# Patient Record
Sex: FEMALE | Race: WHITE | NOT HISPANIC OR LATINO | Employment: PART TIME | URBAN - NONMETROPOLITAN AREA
[De-identification: names, ages, dates, MRNs, and addresses within clinical notes are randomized per-mention and may not be internally consistent; named-entity substitution may affect disease eponyms.]

---
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
## 2022-05-24 ENCOUNTER — INITIAL PRENATAL (OUTPATIENT)
Dept: OBSTETRICS AND GYNECOLOGY | Facility: CLINIC | Age: 22
End: 2022-05-24

## 2022-05-24 VITALS — SYSTOLIC BLOOD PRESSURE: 110 MMHG | BODY MASS INDEX: 26.32 KG/M2 | DIASTOLIC BLOOD PRESSURE: 64 MMHG | WEIGHT: 148.6 LBS

## 2022-05-24 DIAGNOSIS — Z3A.01 LESS THAN 8 WEEKS GESTATION OF PREGNANCY: Primary | ICD-10-CM

## 2022-05-24 PROCEDURE — 99203 OFFICE O/P NEW LOW 30 MIN: CPT | Performed by: OBSTETRICS & GYNECOLOGY

## 2022-05-24 RX ORDER — METOCLOPRAMIDE 10 MG/1
10 TABLET ORAL
Qty: 120 TABLET | Refills: 2 | Status: SHIPPED | OUTPATIENT
Start: 2022-05-24 | End: 2022-10-07 | Stop reason: SDUPTHER

## 2022-05-24 NOTE — PROGRESS NOTES
Subjective   Chief Complaint   Patient presents with   • Initial Prenatal Visit     NOB With LMP 2022. TVS today with EDC 2023     Summer Elmore is a 22 y.o. year old .  No LMP recorded. Patient is pregnant.  She presents to be seen because of initiate of surgical care.  Ultrasound as noted below shows a 8-week 0-day viable intrauterine pregnancy.  Patient had a prior vaginal delivery at term 7 pounds no issues.  Overall good health.  She is status post gallbladder and tonsils removal  .     OTHER COMPLAINTS:  Nothing else    The following portions of the patient's history were reviewed and updated as appropriate:  She  has no past medical history on file.  She does not have any pertinent problems on file.  She  has a past surgical history that includes Cholecystectomy; Tonsillectomy; and Adenoidectomy.  Her family history includes Coronary artery disease in her maternal grandfather, maternal grandmother, paternal grandfather, and paternal grandmother; Hypertension in her father.  She  reports that she has quit smoking. Her smoking use included cigarettes. She has never used smokeless tobacco. She reports that she does not drink alcohol and does not use drugs.  No current outpatient medications on file.     No current facility-administered medications for this visit.     No current outpatient medications on file prior to visit.     No current facility-administered medications on file prior to visit.     She is allergic to morphine and related, oxycodone, and amoxicillin.    Social History    Tobacco Use      Smoking status: Former Smoker        Types: Cigarettes      Smokeless tobacco: Never Used      Tobacco comment: 4-5 cig/day    Review of Systems  Consitutional POS: nothing reported    NEG: anorexia or night sweats   Gastointestinal POS: nothing reported    NEG: bloating, change in bowel habits, melena or reflux symptoms   Genitourinary POS: nothing reported    NEG: dysuria or hematuria    Integument POS: nothing reported    NEG: moles that are changing in size, shape, color or rashes   Breast POS: nothing reported    NEG: persistent breast lump, skin dimpling or nipple discharge         Respiratory: negative  Cardiovascular: negative          Objective   /64   Wt 67.4 kg (148 lb 9.6 oz)   BMI 26.32 kg/m²     General:  well developed; well nourished  no acute distress   Skin:  No suspicious lesions seen   Thyroid: normal to inspection and palpation   Lungs:  breathing is unlabored  clear to auscultation bilaterally   Heart:  regular rate and rhythm, S1, S2 normal, no murmur, click, rub or gallop   Breasts:  Examined in supine position  Symmetric without masses or skin dimpling  Nipples normal without inversion, lesions or discharge  There are no palpable axillary nodes   Abdomen: soft, non-tender; no masses  no umbilical or inguinal hernias are present  no hepato-splenomegaly   Pelvis: Clinical staff was present for exam  External genitalia:  normal appearance of the external genitalia including Bartholin's and Brea's glands.  :  urethral meatus normal;  Vaginal:  normal pink mucosa without prolapse or lesions.  Cervix:  normal appearance.  Uterus:  normal size, shape and consistency.  Adnexa:  normal bimanual exam of the adnexa.  Rectal:  digital rectal exam not performed; anus visually normal appearing.     Psychiatric: Alert and oriented ×3, mood and affect appropriate  HEENT: Atraumatic, normocephalic, normal scleral icterus  Extremities: 2+ pulses bilaterally, no edema      Lab Review   No data reviewed    Imaging   Pelvic ultrasound images independantly reviewed - Intrauterine pregnancy 8 weeks 0 days.  PRIYA will be January 3, 2023.  Positive cardiac activity.  Simple 1.9 cm right ovarian cyst with thin septation.  Normal flow.  Normal cervix.  No solid masses        Assessment   1. Intrauterine pregnancy at 8 weeks  2. Prior a benign term vaginal delivery at 39 weeks 7  pounds  3. Nausea vomiting of pregnancy     Plan   1. Pap smear with cultures done  2. Prenatal labs  3. Reglan 4 times daily    No orders of the defined types were placed in this encounter.         This note was electronically signed.      May 24, 2022

## 2022-05-25 LAB
ABO GROUP BLD: NORMAL
BASOPHILS # BLD AUTO: 0 X10E3/UL (ref 0–0.2)
BASOPHILS NFR BLD AUTO: 1 %
BLD GP AB SCN SERPL QL: NEGATIVE
EOSINOPHIL # BLD AUTO: 0.3 X10E3/UL (ref 0–0.4)
EOSINOPHIL NFR BLD AUTO: 4 %
ERYTHROCYTE [DISTWIDTH] IN BLOOD BY AUTOMATED COUNT: 11.8 % (ref 11.7–15.4)
HBV SURFACE AG SERPL QL IA: NEGATIVE
HCT VFR BLD AUTO: 40.4 % (ref 34–46.6)
HCV AB S/CO SERPL IA: <0.1 S/CO RATIO (ref 0–0.9)
HGB BLD-MCNC: 13.3 G/DL (ref 11.1–15.9)
HIV 1+2 AB+HIV1 P24 AG SERPL QL IA: NON REACTIVE
IMM GRANULOCYTES # BLD AUTO: 0 X10E3/UL (ref 0–0.1)
IMM GRANULOCYTES NFR BLD AUTO: 0 %
LYMPHOCYTES # BLD AUTO: 1.2 X10E3/UL (ref 0.7–3.1)
LYMPHOCYTES NFR BLD AUTO: 18 %
MCH RBC QN AUTO: 31 PG (ref 26.6–33)
MCHC RBC AUTO-ENTMCNC: 32.9 G/DL (ref 31.5–35.7)
MCV RBC AUTO: 94 FL (ref 79–97)
MONOCYTES # BLD AUTO: 0.4 X10E3/UL (ref 0.1–0.9)
MONOCYTES NFR BLD AUTO: 6 %
NEUTROPHILS # BLD AUTO: 4.8 X10E3/UL (ref 1.4–7)
NEUTROPHILS NFR BLD AUTO: 71 %
PLATELET # BLD AUTO: 216 X10E3/UL (ref 150–450)
RBC # BLD AUTO: 4.29 X10E6/UL (ref 3.77–5.28)
RH BLD: NEGATIVE
RPR SER QL: NON REACTIVE
RUBV IGG SERPL IA-ACNC: 1.44 INDEX
WBC # BLD AUTO: 6.7 X10E3/UL (ref 3.4–10.8)

## 2022-05-27 LAB — REF LAB TEST METHOD: NORMAL

## 2022-06-20 ENCOUNTER — ROUTINE PRENATAL (OUTPATIENT)
Dept: OBSTETRICS AND GYNECOLOGY | Facility: CLINIC | Age: 22
End: 2022-06-20

## 2022-06-20 VITALS — DIASTOLIC BLOOD PRESSURE: 68 MMHG | WEIGHT: 149 LBS | BODY MASS INDEX: 26.39 KG/M2 | SYSTOLIC BLOOD PRESSURE: 108 MMHG

## 2022-06-20 DIAGNOSIS — Z3A.11 11 WEEKS GESTATION OF PREGNANCY: Primary | ICD-10-CM

## 2022-06-20 PROCEDURE — 99213 OFFICE O/P EST LOW 20 MIN: CPT | Performed by: MIDWIFE

## 2022-06-20 RX ORDER — FAMOTIDINE 20 MG/1
20 TABLET, FILM COATED ORAL 2 TIMES DAILY PRN
Qty: 60 TABLET | Refills: 2 | Status: SHIPPED | OUTPATIENT
Start: 2022-06-20 | End: 2022-10-20 | Stop reason: SDUPTHER

## 2022-06-20 NOTE — PROGRESS NOTES
Chief Complaint   Patient presents with   • Routine Prenatal Visit     C/o of migraine, heartburn , patient wants to do materniti 21 today        HPI: Summer is a  currently at 11w6d here for prenatal visit who reports the following:  She has been having intermittent migraines. She is taking Tylenol which doesn't help much. She usually goes to bed in a dark room for relief. She is also having heartburn and not taking anything for it.                EXAM:     Vitals:    22 1450   BP: 108/68      Abdomen:   See prenatal flowsheet as noted and reviewed, soft, nontender   Pelvic:  See prenatal flowsheet as noted and reviewed   Urine:  See prenatal flowsheet as noted and reviewed    Lab Results   Component Value Date    ABO AB 2022    RH Negative 2022    ABSCRN Negative 2022       MDM:  Impression: Rh negative  Headaches  GERD in pregnancy   Tests done today: Genetic screening-MaterniTi 21   Topics discussed: GERD management-Pepcid BIDHeadaches-increase water and protein. Offered Magnesium Rx   but declined  Reviewed OB labs   Tests next visit: none                RTO:                        4 weeks    This note was electronically signed.  Nano Santos, INGE  2022

## 2022-06-25 LAB
CFDNA.FET/CFDNA.TOTAL SFR FETUS: NORMAL %
CITATION REF LAB TEST: NORMAL
FET 13+18+21+X+Y ANEUP PLAS.CFDNA: NEGATIVE
FET CHR 21 TS PLAS.CFDNA QL: NEGATIVE
FET MS X RISK WBC.DNA+CFDNA QL: NOT DETECTED
FET SEX PLAS.CFDNA DOSAGE CFDNA: NORMAL
FET TS 13 RISK PLAS.CFDNA QL: NEGATIVE
FET TS 18 RISK WBC.DNA+CFDNA QL: NEGATIVE
FET X + Y ANEUP RISK PLAS.CFDNA SEQ-IMP: NOT DETECTED
GA EST FROM CONCEPTION DATE: NORMAL D
GESTATIONAL AGE > 9:: YES
LAB DIRECTOR NAME PROVIDER: NORMAL
LAB DIRECTOR NAME PROVIDER: NORMAL
LABORATORY COMMENT REPORT: NORMAL
LIMITATIONS OF THE TEST: NORMAL
NEGATIVE PREDICTIVE VALUE: NORMAL
NOTE: NORMAL
PERFORMANCE CHARACTERISTICS: NORMAL
POSITIVE PREDICTIVE VALUE: NORMAL
REF LAB TEST METHOD: NORMAL
TEST PERFORMANCE INFO SPEC: NORMAL

## 2022-07-18 ENCOUNTER — ROUTINE PRENATAL (OUTPATIENT)
Dept: OBSTETRICS AND GYNECOLOGY | Facility: CLINIC | Age: 22
End: 2022-07-18

## 2022-07-18 VITALS — BODY MASS INDEX: 26.39 KG/M2 | DIASTOLIC BLOOD PRESSURE: 60 MMHG | SYSTOLIC BLOOD PRESSURE: 106 MMHG | WEIGHT: 149 LBS

## 2022-07-18 DIAGNOSIS — Z34.82 ENCOUNTER FOR SUPERVISION OF OTHER NORMAL PREGNANCY, SECOND TRIMESTER: Primary | ICD-10-CM

## 2022-07-18 DIAGNOSIS — O21.9 NAUSEA AND VOMITING DURING PREGNANCY PRIOR TO 22 WEEKS GESTATION: ICD-10-CM

## 2022-07-18 DIAGNOSIS — K21.9 GASTROESOPHAGEAL REFLUX DISEASE WITHOUT ESOPHAGITIS: ICD-10-CM

## 2022-07-18 PROCEDURE — 99214 OFFICE O/P EST MOD 30 MIN: CPT | Performed by: OBSTETRICS & GYNECOLOGY

## 2022-07-18 NOTE — PROGRESS NOTES
Chief Complaint  Routine Prenatal Visit (No complaints. )    History of Present Illness:  Summer is a  currently at 15w6d who presents today with no complaints.  Patient does report having continued nausea however her symptoms are controlled with the Reglan.  Patient has been taking it twice daily.  Patient has also continued to take her Pepcid twice daily.  She does report taking prenatal vitamins.  Patient does report positive fetal movement.  She denies any cramping or contractions.  Patient denies any vaginal bleeding or spotting.  Patient did have previous genetic screening.    Exam:  Vitals:  See prenatal flowsheet as noted and reviewed  General: Alert, cooperative, and does not appear in any distress  Abdomen:   See prenatal flowsheet as noted and reviewed    Uterus gravid, non-tender; no palpable masses    No guarding or rebound tenderness  Pelvic:  See prenatal flowsheet as noted and reviewed  Ext:  See prenatal flowsheet as noted and reviewed    Moves extremities well, no cyanosis and no redness  Urine:  See prenatal flowsheet as noted and reviewed    Data Review:  The following data was reviewed by: Laly Damian MD on 2022:  Prenatal Labs:  Lab Results   Component Value Date    HGB 13.3 2022    RUBELLAABIGG 1.44 2022    HEPBSAG Negative 2022    ABO AB 2022    RH Negative 2022    ABSCRN Negative 2022    SJT9RKZ9 Non Reactive 2022    HEPCVIRUSABY <0.1 2022    POS8GCQR 135 2017    STREPGPB Negative 09/15/2017       Orders Only on 2022   Component Date Value   • Gestation 2022 Gomez    • Fetal Fraction 2022 5%    • Gestational Age >9: 2022 Yes    • Result 2022 Negative    •  Comments 2022 Comment    • Approved By 2022 Comment    • TRISOMY 21 (DOWN SYNDROM* 2022 Negative    • TRISOMY 18 (WYNNE SYND* 2022 Negative    • TRISOMY 13 (PATAU SYNDRO* 2022 Negative    •  FETAL SEX 2022 Comment    • MONOSOMY X (HICKS SYNDR* 2022 Not Detected    • XYY (ROMERO SYNDROME) 2022 Not Detected    • XXY (KLINEFELTER SYNDROM* 2022 Not Detected    • XXX (TRIPLE X SYNDROME) 2022 Not Detected    • NEGATIVE PREDICTIVE VALUE 2022 Note    • POSITIVE PREDICTIVE VALUE 2022 N/A    • About The Test 2022 Comment    • Test Method 2022 Comment    • Performance 2022 Comment    • PERFORMANCE CHARACTERIST* 2022 Note    • Limitations of the Test 2022 Comment    • Note 2022 Comment    • References 2022 Comment      Imaging:  US Ob Transvaginal   Intrauterine pregnancy 8 weeks 0 days.  PRIYA will be January 3, 2023.    Positive cardiac activity.  Simple 1.9 cm right ovarian cyst with thin   septation.  Normal flow.  Normal cervix.  No solid masses      Medical Records:  None    Assessment and Plan:  Problem List Items Addressed This Visit    None     Visit Diagnoses     Encounter for supervision of other normal pregnancy, second trimester    -  Primary  Topics discussed:     ab precautions  genetic screening - Today we discussed genetic testing.  She is aware that the MSAFP-4 is a screening test.  A screening test is not a diagnostic test.  This means that a negative test does not guarantee an unaffected fetus and a positive test does not mean the fetus has the condition for which the test is being performed.  If the test returns positive, a diagnostic test should be consider to determine if the fetus in fact has the condition.  After considering the options previously presented, she is not interested in having genetic testing performed.  GERD management  kick counts and fetal movement  PIH precautions   labor signs and symptoms  Anatomic scan next visit as noted.  Patient is to consider MSAFP.    Relevant Orders    US Ob 14 + Weeks Single or First Gestation    Nausea and vomiting during pregnancy prior to 22 weeks  gestation      Patient is to continue her Reglan as previously given.    Gastroesophageal reflux disease without esophagitis      Patient is to continue her Pepcid as previously given.        Follow Up/Instructions:  Follow up as scheduled.  Patient was given instructions and counseling regarding her condition or for health maintenance advice. Please see specific information pulled into the AVS if appropriate.     Note: Speech recognition transcription software may have been used to dictate portions of this document.  An attempt at proofreading has been made though minor errors in transcription may still be present.    This note was electronically signed.  Laly Damian M.D.

## 2022-08-24 ENCOUNTER — ROUTINE PRENATAL (OUTPATIENT)
Dept: OBSTETRICS AND GYNECOLOGY | Facility: CLINIC | Age: 22
End: 2022-08-24

## 2022-08-24 VITALS — DIASTOLIC BLOOD PRESSURE: 60 MMHG | WEIGHT: 152 LBS | SYSTOLIC BLOOD PRESSURE: 104 MMHG | BODY MASS INDEX: 26.93 KG/M2

## 2022-08-24 DIAGNOSIS — Z34.92 PRENATAL CARE IN SECOND TRIMESTER: Primary | ICD-10-CM

## 2022-08-24 PROCEDURE — 99213 OFFICE O/P EST LOW 20 MIN: CPT | Performed by: OBSTETRICS & GYNECOLOGY

## 2022-09-19 ENCOUNTER — ROUTINE PRENATAL (OUTPATIENT)
Dept: OBSTETRICS AND GYNECOLOGY | Facility: CLINIC | Age: 22
End: 2022-09-19

## 2022-09-19 VITALS — BODY MASS INDEX: 27.99 KG/M2 | SYSTOLIC BLOOD PRESSURE: 112 MMHG | DIASTOLIC BLOOD PRESSURE: 68 MMHG | WEIGHT: 158 LBS

## 2022-09-19 DIAGNOSIS — Z3A.24 24 WEEKS GESTATION OF PREGNANCY: ICD-10-CM

## 2022-09-19 DIAGNOSIS — Z34.82 ENCOUNTER FOR SUPERVISION OF OTHER NORMAL PREGNANCY IN SECOND TRIMESTER: Primary | ICD-10-CM

## 2022-09-19 LAB
BASOPHILS # BLD AUTO: 0.03 10*3/MM3 (ref 0–0.2)
BASOPHILS NFR BLD AUTO: 0.4 % (ref 0–1.5)
EOSINOPHIL # BLD AUTO: 0.14 10*3/MM3 (ref 0–0.4)
EOSINOPHIL NFR BLD AUTO: 1.8 % (ref 0.3–6.2)
ERYTHROCYTE [DISTWIDTH] IN BLOOD BY AUTOMATED COUNT: 12.2 % (ref 12.3–15.4)
GLUCOSE 1H P 50 G GLC PO SERPL-MCNC: 125 MG/DL (ref 65–139)
HCT VFR BLD AUTO: 33 % (ref 34–46.6)
HGB BLD-MCNC: 11.2 G/DL (ref 12–15.9)
IMM GRANULOCYTES # BLD AUTO: 0.05 10*3/MM3 (ref 0–0.05)
IMM GRANULOCYTES NFR BLD AUTO: 0.6 % (ref 0–0.5)
LYMPHOCYTES # BLD AUTO: 0.93 10*3/MM3 (ref 0.7–3.1)
LYMPHOCYTES NFR BLD AUTO: 12 % (ref 19.6–45.3)
MCH RBC QN AUTO: 32.9 PG (ref 26.6–33)
MCHC RBC AUTO-ENTMCNC: 33.9 G/DL (ref 31.5–35.7)
MCV RBC AUTO: 97.1 FL (ref 79–97)
MONOCYTES # BLD AUTO: 0.42 10*3/MM3 (ref 0.1–0.9)
MONOCYTES NFR BLD AUTO: 5.4 % (ref 5–12)
NEUTROPHILS # BLD AUTO: 6.15 10*3/MM3 (ref 1.7–7)
NEUTROPHILS NFR BLD AUTO: 79.8 % (ref 42.7–76)
NRBC BLD AUTO-RTO: 0 /100 WBC (ref 0–0.2)
PLATELET # BLD AUTO: 149 10*3/MM3 (ref 140–450)
RBC # BLD AUTO: 3.4 10*6/MM3 (ref 3.77–5.28)
WBC # BLD AUTO: 7.72 10*3/MM3 (ref 3.4–10.8)

## 2022-09-19 PROCEDURE — 99213 OFFICE O/P EST LOW 20 MIN: CPT | Performed by: MIDWIFE

## 2022-09-19 NOTE — PROGRESS NOTES
Chief Complaint   Patient presents with   • Routine Prenatal Visit     No Complaints/concerns        HPI: Summer is a  currently at 24w6d here for prenatal visit who reports the following:  Baby is active. Her nausea is improving.                EXAM:     Vitals:    22 0909   BP: 112/68      Abdomen:   See prenatal flowsheet as noted and reviewed, soft, nontender   Pelvic:  See prenatal flowsheet as noted and reviewed   Urine:  See prenatal flowsheet as noted and reviewed    Lab Results   Component Value Date    ABO AB 2022    RH Negative 2022    ABSCRN Negative 2022       MDM:  Impression: Supervision of low risk pregnancy  Rh negative   Tests done today: GCT  HgB   Topics discussed: kick counts and fetal movement  Reviewed OB labs   Tests next visit: Rhogam                RTO:                        3 weeks    This note was electronically signed.  Nano Santos, INGE  2022

## 2022-09-20 RX ORDER — FERROUS SULFATE TAB EC 324 MG (65 MG FE EQUIVALENT) 324 (65 FE) MG
324 TABLET DELAYED RESPONSE ORAL 2 TIMES DAILY WITH MEALS
Qty: 60 TABLET | Refills: 12 | Status: SHIPPED | OUTPATIENT
Start: 2022-09-20 | End: 2023-02-06

## 2022-10-07 RX ORDER — METOCLOPRAMIDE 10 MG/1
10 TABLET ORAL
Qty: 120 TABLET | Refills: 2 | Status: SHIPPED | OUTPATIENT
Start: 2022-10-07 | End: 2022-12-24 | Stop reason: HOSPADM

## 2022-10-10 ENCOUNTER — ROUTINE PRENATAL (OUTPATIENT)
Dept: OBSTETRICS AND GYNECOLOGY | Facility: CLINIC | Age: 22
End: 2022-10-10

## 2022-10-10 VITALS — WEIGHT: 160 LBS | SYSTOLIC BLOOD PRESSURE: 108 MMHG | DIASTOLIC BLOOD PRESSURE: 58 MMHG | BODY MASS INDEX: 28.34 KG/M2

## 2022-10-10 DIAGNOSIS — O99.019 MATERNAL ANEMIA IN PREGNANCY, ANTEPARTUM: ICD-10-CM

## 2022-10-10 DIAGNOSIS — Z34.82 ENCOUNTER FOR SUPERVISION OF OTHER NORMAL PREGNANCY IN SECOND TRIMESTER: Primary | ICD-10-CM

## 2022-10-10 DIAGNOSIS — Z67.91 RH NEGATIVE STATUS DURING PREGNANCY IN SECOND TRIMESTER: ICD-10-CM

## 2022-10-10 DIAGNOSIS — O26.892 RH NEGATIVE STATUS DURING PREGNANCY IN SECOND TRIMESTER: ICD-10-CM

## 2022-10-10 PROCEDURE — 96372 THER/PROPH/DIAG INJ SC/IM: CPT | Performed by: STUDENT IN AN ORGANIZED HEALTH CARE EDUCATION/TRAINING PROGRAM

## 2022-10-10 PROCEDURE — 99214 OFFICE O/P EST MOD 30 MIN: CPT | Performed by: STUDENT IN AN ORGANIZED HEALTH CARE EDUCATION/TRAINING PROGRAM

## 2022-10-10 NOTE — PROGRESS NOTES
Prenatal Care Visit    Subjective   Chief Complaint   Patient presents with   • Routine Prenatal Visit     History:   Summer is a  currently at 27w6d who presents for a prenatal care visit today.    Doing well. Denies CTX, VB, LOF. Reports (+) FM.     Objective   /58   Wt 72.6 kg (160 lb)   BMI 28.34 kg/m²   Physical Exam:  Normal, gestational age-appropriate exam today      Assessment & Plan     1. IUP @ 27w6d  2. Routine care: I have reviewed the prenatal labs and ultrasound(s) today. I have reviewed the most recent prenatal progress note(s). GTT and CBC reviewed.  3. Rh (-): Rhogam today  4. Anemia: on iron BID     Diagnosis Plan   1. Encounter for supervision of other normal pregnancy in second trimester        2. Rh negative status during pregnancy in second trimester        3. Maternal anemia in pregnancy, antepartum           Medication Management: Rhogam today, continue PNV and iron    Topics discussed: Prenatal care milestones  Iron supplementation  Kick counts and fetal movement  Labor signs and symptoms   Tests next visit: none   Next visit: 2 week(s)     Jana Corona MD  Obstetrics and Gynecology  [facility]

## 2022-10-19 ENCOUNTER — HOSPITAL ENCOUNTER (OUTPATIENT)
Facility: HOSPITAL | Age: 22
Discharge: HOME | End: 2022-10-19
Attending: OBSTETRICS & GYNECOLOGY | Admitting: OBSTETRICS & GYNECOLOGY

## 2022-10-19 VITALS
SYSTOLIC BLOOD PRESSURE: 112 MMHG | WEIGHT: 161 LBS | HEART RATE: 78 BPM | BODY MASS INDEX: 28.52 KG/M2 | TEMPERATURE: 98.6 F | RESPIRATION RATE: 16 BRPM | DIASTOLIC BLOOD PRESSURE: 62 MMHG

## 2022-10-19 LAB
BILIRUB BLD-MCNC: NEGATIVE MG/DL
CLARITY, POC: CLEAR
COLOR UR: YELLOW
GLUCOSE BLDC GLUCOMTR-MCNC: 91 MG/DL (ref 70–130)
GLUCOSE UR STRIP-MCNC: NEGATIVE MG/DL
KETONES UR QL: NEGATIVE
LEUKOCYTE EST, POC: NEGATIVE
NITRITE UR-MCNC: NEGATIVE MG/ML
PH UR: 7.5 [PH] (ref 5–8)
PROT UR STRIP-MCNC: NEGATIVE MG/DL
RBC # UR STRIP: NEGATIVE /UL
SP GR UR: 1 (ref 1–1.03)
UROBILINOGEN UR QL: NORMAL

## 2022-10-19 PROCEDURE — 82962 GLUCOSE BLOOD TEST: CPT

## 2022-10-19 PROCEDURE — 81002 URINALYSIS NONAUTO W/O SCOPE: CPT | Performed by: OBSTETRICS & GYNECOLOGY

## 2022-10-19 PROCEDURE — 59025 FETAL NON-STRESS TEST: CPT

## 2022-10-19 PROCEDURE — 59025 FETAL NON-STRESS TEST: CPT | Performed by: OBSTETRICS & GYNECOLOGY

## 2022-10-19 PROCEDURE — G0463 HOSPITAL OUTPT CLINIC VISIT: HCPCS

## 2022-10-19 RX ORDER — FAMOTIDINE 20 MG/1
20 TABLET, FILM COATED ORAL ONCE
Status: COMPLETED | OUTPATIENT
Start: 2022-10-19 | End: 2022-10-19

## 2022-10-19 RX ORDER — FAMOTIDINE 20 MG/1
20 TABLET, FILM COATED ORAL
Status: DISCONTINUED | OUTPATIENT
Start: 2022-10-20 | End: 2022-10-19

## 2022-10-19 RX ADMIN — FAMOTIDINE 20 MG: 20 TABLET ORAL at 20:40

## 2022-10-20 ENCOUNTER — TELEPHONE (OUTPATIENT)
Dept: OBSTETRICS AND GYNECOLOGY | Facility: CLINIC | Age: 22
End: 2022-10-20

## 2022-10-20 ENCOUNTER — HOSPITAL ENCOUNTER (OUTPATIENT)
Facility: HOSPITAL | Age: 22
Discharge: HOME | End: 2022-10-20
Attending: MIDWIFE | Admitting: MIDWIFE

## 2022-10-20 ENCOUNTER — HOSPITAL ENCOUNTER (OUTPATIENT)
Facility: HOSPITAL | Age: 22
End: 2022-10-20
Attending: MIDWIFE | Admitting: MIDWIFE

## 2022-10-20 VITALS
BODY MASS INDEX: 27.2 KG/M2 | HEART RATE: 92 BPM | WEIGHT: 159.3 LBS | RESPIRATION RATE: 18 BRPM | OXYGEN SATURATION: 97 % | HEIGHT: 64 IN | DIASTOLIC BLOOD PRESSURE: 64 MMHG | SYSTOLIC BLOOD PRESSURE: 114 MMHG

## 2022-10-20 LAB
BACTERIA UR QL AUTO: ABNORMAL /HPF
BILIRUB UR QL STRIP: NEGATIVE
CLARITY UR: ABNORMAL
COLOR UR: YELLOW
GLUCOSE UR STRIP-MCNC: NEGATIVE MG/DL
HGB UR QL STRIP.AUTO: NEGATIVE
HYALINE CASTS UR QL AUTO: ABNORMAL /LPF
KETONES UR QL STRIP: ABNORMAL
LEUKOCYTE ESTERASE UR QL STRIP.AUTO: ABNORMAL
NITRITE UR QL STRIP: NEGATIVE
PH UR STRIP.AUTO: 7 [PH] (ref 5–8)
PROT UR QL STRIP: NEGATIVE
RBC # UR STRIP: ABNORMAL /HPF
REF LAB TEST METHOD: ABNORMAL
SP GR UR STRIP: 1.01 (ref 1–1.03)
SQUAMOUS #/AREA URNS HPF: ABNORMAL /HPF
UROBILINOGEN UR QL STRIP: ABNORMAL
WBC # UR STRIP: ABNORMAL /HPF

## 2022-10-20 PROCEDURE — G0463 HOSPITAL OUTPT CLINIC VISIT: HCPCS

## 2022-10-20 PROCEDURE — 81001 URINALYSIS AUTO W/SCOPE: CPT | Performed by: MIDWIFE

## 2022-10-20 PROCEDURE — 87086 URINE CULTURE/COLONY COUNT: CPT | Performed by: MIDWIFE

## 2022-10-20 RX ORDER — FAMOTIDINE 20 MG/1
20 TABLET, FILM COATED ORAL 2 TIMES DAILY PRN
Qty: 60 TABLET | Refills: 4 | Status: SHIPPED | OUTPATIENT
Start: 2022-10-20 | End: 2022-10-20

## 2022-10-20 RX ORDER — FAMOTIDINE 20 MG/1
20 TABLET, FILM COATED ORAL 2 TIMES DAILY PRN
Qty: 60 TABLET | Refills: 4 | Status: SHIPPED | OUTPATIENT
Start: 2022-10-20 | End: 2023-02-06

## 2022-10-20 RX ORDER — NITROFURANTOIN 25; 75 MG/1; MG/1
100 CAPSULE ORAL EVERY 12 HOURS SCHEDULED
Status: COMPLETED | OUTPATIENT
Start: 2022-10-20 | End: 2022-10-20

## 2022-10-20 RX ADMIN — NITROFURANTOIN MONOHYDRATE/MACROCRYSTALLINE 100 MG: 25; 75 CAPSULE ORAL at 22:59

## 2022-10-20 NOTE — DISCHARGE INSTR - ACTIVITY
Go home on modified bedrest tonight.  Call tomorrow to get a refill for your prescription for Pepcid.  Keep your appointment on Monday. Come back if you are having any signs of labor.

## 2022-10-20 NOTE — SIGNIFICANT NOTE
10/19/22 2008   General Information   Provider Name Notified Of Admission Dr Laly Damian notified of pt's complaints, Hx BM today, SVE cl/th/-2 and soft, c/o heartburn and her running out of Pepcid.  Orders received to give a dose of 20mg Pepcid PO, then watch pt for awhile and pt may go home if she feels better after PO hydration and Pepcid.   Admission Provider Notification Time 2008

## 2022-10-20 NOTE — NON STRESS TEST
"Triage Note - Nursing Documentation  Labor and Delivery Admission Log    Summer Elmore  : 2000  MRN: [id number]  CSN: [id number]    Date in / Time in:  10/19/2022  Time in:     Date out / Time out:  10/19/2022  Time out:     Nurse: Ellyn Ferris, RN    Patient Info: She is a 22 y.o. year old  at 29w1d with an PRIYA of 1/3/2023, by Ultrasound who was seen on the [facility] Labor Flor. Pt placed on EFM and TOCO and given water to drink. SVE done. Closed/thick/-2.  Cervix cone shaped with cervix thicker at the base and thinner at the end, but completely closed mispositioned and very soft.  Watched pt an hour and a half after giving Pepcid. Pt states she is hungry and would like to eat something.  Pt given ham sandwich to eat.  Pt felt a little better after eating, but had two moderate contractions. Pt voided a large void and then contractions palpated mild.  Pt requesting to go home stating she felt a lot better.  Discharge instructions given to call the office in the morning to refill her Pepcid and to keep her appointment on the  with Vidya, but to come back sooner or call the office if she has any more signs of  labor.  Pt verbalized and able to teach back understanding of instructions.    Chief Complaint:   Chief Complaint   Patient presents with   • Abdominal Pain     \"I'm having abdominal pain and all over my middle to lower back.\"       Provider Instructions / Disposition: Pt c/o heartburn. Orders reciived from Dr Laly Damian to give pt Pepcid and see how pt feels after.    Patient Active Problem List   Diagnosis   (none) - all problems resolved or deleted       NST Documentation (Only applicable > 32 weeks): Interpretation A  Nonstress Test Interpretation A: Reactive (10/19/22 2215 : Ellyn Ferris, RN)    "

## 2022-10-20 NOTE — TELEPHONE ENCOUNTER
----- Message from Brie Fung MA sent at 10/20/2022 11:17 AM EDT -----  Patient is requesting refills on heart burn medication    Dr Chloe Forrest [facility]    Thank You

## 2022-10-21 RX ORDER — NITROFURANTOIN 25; 75 MG/1; MG/1
100 CAPSULE ORAL 2 TIMES DAILY
Qty: 14 CAPSULE | Refills: 0 | Status: SHIPPED | OUTPATIENT
Start: 2022-10-21 | End: 2022-10-28

## 2022-10-21 NOTE — NON STRESS TEST
Non Stress Test    [facility]    Patient: Summer Elmore  : 2000  MRN: [id number]  CSN: [id number]    Gestational Age: 29w1d    Indication for NST Abdominal pain and cramping  GERD       Time On 19:04   Time Off 22:12       Interpretation    Baseline 's beats per minute   Category 1   Decelerations Absent       Additional Comments See nursing notes       Recommendations for f/u See nursing notes       This note has been electronically signed.    Laly Damian M.D.

## 2022-10-21 NOTE — NON STRESS TEST
Triage Note - Nursing Documentation  Labor and Delivery Admission Log    Summer Elmore  : 2000  MRN: [id number]  CSN: [id number]    Date in / Time in:  10/20/2022  Time in:     Date out / Time out:    Time out:     Nurse: Alvina Ovalle RN    Patient Info: She is a 22 y.o. year old  at 29w2d with an PRIYA of 1/3/2023, by Ultrasound who was seen on the [facility] Labor Gross.    Chief Complaint:   Chief Complaint   Patient presents with   • Contractions   • Abdominal Pain       Provider Instructions / Disposition: discharge home, instructed on fetal kick counts, s/s of  labor and when to return to labor gross. Follow up with Ob office on Monday. Will send in prescription for Macrobid in am to [facility] in [facility]    Patient Active Problem List   Diagnosis   (none) - all problems resolved or deleted       NST Documentation (Only applicable > 32 weeks):

## 2022-10-22 LAB — BACTERIA SPEC AEROBE CULT: NORMAL

## 2022-10-24 ENCOUNTER — ROUTINE PRENATAL (OUTPATIENT)
Dept: OBSTETRICS AND GYNECOLOGY | Facility: CLINIC | Age: 22
End: 2022-10-24

## 2022-10-24 VITALS — SYSTOLIC BLOOD PRESSURE: 112 MMHG | BODY MASS INDEX: 27.29 KG/M2 | DIASTOLIC BLOOD PRESSURE: 68 MMHG | WEIGHT: 159 LBS

## 2022-10-24 DIAGNOSIS — Z34.83 ENCOUNTER FOR SUPERVISION OF OTHER NORMAL PREGNANCY IN THIRD TRIMESTER: Primary | ICD-10-CM

## 2022-10-24 PROCEDURE — 99213 OFFICE O/P EST LOW 20 MIN: CPT | Performed by: MIDWIFE

## 2022-10-24 NOTE — PROGRESS NOTES
Chief Complaint   Patient presents with   • Routine Prenatal Visit     No Complaints/concerns        HPI: Summer is a  currently at 29w6d here for prenatal visit who reports the following:  Baby is active. She went to  with C/O ctxs 2 days last week. She was treated with Macrobid and is feeling better. She denies any ctxs.                EXAM:     Vitals:    10/24/22 0904   BP: 112/68      Abdomen:   See prenatal flowsheet as noted and reviewed, soft, nontender   Pelvic:  See prenatal flowsheet as noted and reviewed   Urine:  See prenatal flowsheet as noted and reviewed    Lab Results   Component Value Date    ABO AB 2022    RH Negative 2022    ABSCRN Negative 2022       MDM:  Impression: Supervision of low risk pregnancy  Rh negative  Anemia in pregnancy   Tests done today: none   Topics discussed: kick counts and fetal movement   labor signs and symptoms  Reviewed OB labs   Tests next visit: U/S for EFW                RTO:                        2 weeks    This note was electronically signed.  Nano Santos, APRN  10/24/2022

## 2022-11-07 ENCOUNTER — ROUTINE PRENATAL (OUTPATIENT)
Dept: OBSTETRICS AND GYNECOLOGY | Facility: CLINIC | Age: 22
End: 2022-11-07

## 2022-11-07 VITALS — DIASTOLIC BLOOD PRESSURE: 60 MMHG | SYSTOLIC BLOOD PRESSURE: 116 MMHG | BODY MASS INDEX: 27.12 KG/M2 | WEIGHT: 158 LBS

## 2022-11-07 DIAGNOSIS — O99.019 MATERNAL ANEMIA IN PREGNANCY, ANTEPARTUM: ICD-10-CM

## 2022-11-07 DIAGNOSIS — Z34.83 ENCOUNTER FOR SUPERVISION OF OTHER NORMAL PREGNANCY IN THIRD TRIMESTER: Primary | ICD-10-CM

## 2022-11-07 DIAGNOSIS — Z36.89 ENCOUNTER FOR ULTRASOUND TO ASSESS FETAL GROWTH: ICD-10-CM

## 2022-11-07 DIAGNOSIS — K21.9 GASTROESOPHAGEAL REFLUX DISEASE WITHOUT ESOPHAGITIS: ICD-10-CM

## 2022-11-07 PROCEDURE — 99214 OFFICE O/P EST MOD 30 MIN: CPT | Performed by: OBSTETRICS & GYNECOLOGY

## 2022-11-07 NOTE — PROGRESS NOTES
Chief Complaint  Routine Prenatal Visit (Growth scan today, no complaints. )    History of Present Illness:  Summer is a  currently at 31w6d who presents today with no complaints.  Patient does report good fetal movement.  Patient denies any significant cramping or contractions.  Patient is taking her prenatal vitamins.  She is taking her iron supplements.  She has continued on her Pepcid as well.  Patient had previous delivery at 39 weeks of a girl weighing 7 pounds.  Patient does feel like this infant is larger.    Exam:  Vitals:  See prenatal flowsheet as noted and reviewed  General: Alert, cooperative, and does not appear in any distress  Abdomen:   See prenatal flowsheet as noted and reviewed    Uterus gravid, non-tender; no palpable masses    No guarding or rebound tenderness  Pelvic:  See prenatal flowsheet as noted and reviewed  Ext:  See prenatal flowsheet as noted and reviewed    Moves extremities well, no cyanosis and no redness  Urine:  See prenatal flowsheet as noted and reviewed    Data Review:  The following data was reviewed by: Laly Damian MD on 2022:  Prenatal Labs:  Lab Results   Component Value Date    HGB 11.2 (L) 2022    RUBELLAABIGG 1.44 2022    HEPBSAG Negative 2022    ABO AB 2022    RH Negative 2022    ABSCRN Negative 2022    PAS9ZCU0 Non Reactive 2022    HEPCVIRUSABY <0.1 2022    YGY9UZUY 135 2017    STREPGPB Negative 09/15/2017    URINECX >100,000 CFU/mL Mixed Jolanta Isolated 10/20/2022       Admission on 10/20/2022, Discharged on 10/20/2022   Component Date Value   • Color, UA 10/20/2022 Yellow    • Appearance, UA 10/20/2022 Cloudy (A)    • pH, UA 10/20/2022 7.0    • Specific Gravity, UA 10/20/2022 1.015    • Glucose, UA 10/20/2022 Negative    • Ketones, UA 10/20/2022 Trace (A)    • Bilirubin, UA 10/20/2022 Negative    • Blood, UA 10/20/2022 Negative    • Protein, UA 10/20/2022 Negative    • Leuk Esterase, UA 10/20/2022  Trace (A)    • Nitrite, UA 10/20/2022 Negative    • Urobilinogen, UA 10/20/2022 0.2 E.U./dL    • RBC, UA 10/20/2022 0-2 (A)    • WBC, UA 10/20/2022 3-5 (A)    • Bacteria, UA 10/20/2022 2+ (A)    • Squamous Epithelial Cell* 10/20/2022 3-6 (A)    • Hyaline Casts, UA 10/20/2022 0-2    • Methodology 10/20/2022 Manual Light Microscopy    • Urine Culture 10/20/2022 >100,000 CFU/mL Mixed Jolanta Isolated    Admission on 10/19/2022, Discharged on 10/19/2022   Component Date Value   • Color 10/19/2022 Yellow    • Clarity, UA 10/19/2022 Clear    • Glucose, UA 10/19/2022 Negative    • Bilirubin 10/19/2022 Negative    • Ketones, UA 10/19/2022 Negative    • Specific Gravity  10/19/2022 1.000 (A)    • Blood, UA 10/19/2022 Negative    • pH, Urine 10/19/2022 7.5    • Protein, POC 10/19/2022 Negative    • Urobilinogen, UA 10/19/2022 Normal    • Leukocytes 10/19/2022 Negative    • Nitrite, UA 10/19/2022 Negative    • Glucose 10/19/2022 91      Imaging:  US Ob Follow Up Transabdominal Approach  Summer Elmore  : 2000  MRN: [id number]  Date: 2022    Reason for exam/History:  Growth    Ultrasound images are reviewed.  There is noted to be a viable   intrauterine pregnancy. The pregnancy is measuring 33 weeks 0 days   gestation.  The estimated fetal weight is 2085 grams at the 73.5% for   growth.  The HC was at the 54.8% and the AC was at the 83.7%.  The   amniotic fluid index was 13.84 cms.  The fetal heart rate was normal.     The infant was in the vertex presentation.  The placental location was   noted to be posterior.      The exam limitations noted:  none    See ultrasound report for measurements and structures identified.    Laly Damian MD, MS  [facility]  OB GYN [place]     Medical Records:  None    Assessment and Plan:  Problem List Items Addressed This Visit    None  Visit Diagnoses     Encounter for supervision of other normal pregnancy in third trimester    -  Primary  Topics discussed:     GERD  management  iron supplementation  kick counts and fetal movement  PIH precautions   labor signs and symptoms  Recommend repeat scan in 4 weeks.    Encounter for ultrasound to assess fetal growth      Scan is obtained today.  Patient has been informed regarding those findings.  Repeat imaging in 4 weeks.    Maternal anemia in pregnancy, antepartum      Patient is to continue her iron supplements as discussed.  Instructions and precautions have been given.    Gastroesophageal reflux disease without esophagitis      Patient is to continue her Pepcid as previously given.        Follow Up/Instructions:  Follow up as scheduled.  Patient was given instructions and counseling regarding her condition or for health maintenance advice. Please see specific information pulled into the AVS if appropriate.     Note: Speech recognition transcription software may have been used to dictate portions of this document.  An attempt at proofreading has been made though minor errors in transcription may still be present.    This note was electronically signed.  Laly Damian M.D.

## 2022-11-21 ENCOUNTER — ROUTINE PRENATAL (OUTPATIENT)
Dept: OBSTETRICS AND GYNECOLOGY | Facility: CLINIC | Age: 22
End: 2022-11-21

## 2022-11-21 VITALS — DIASTOLIC BLOOD PRESSURE: 70 MMHG | WEIGHT: 159 LBS | BODY MASS INDEX: 27.29 KG/M2 | SYSTOLIC BLOOD PRESSURE: 112 MMHG

## 2022-11-21 DIAGNOSIS — Z34.93 PRENATAL CARE IN THIRD TRIMESTER: Primary | ICD-10-CM

## 2022-11-21 PROCEDURE — 99213 OFFICE O/P EST LOW 20 MIN: CPT | Performed by: OBSTETRICS & GYNECOLOGY

## 2022-11-21 NOTE — PROGRESS NOTES
Prenatal Care Visit    Subjective   Chief Complaint   Patient presents with   • Routine Prenatal Visit     No complaints       History:   Summer is a  currently at 33w6d who presents for a prenatal care visit today.    No major issues.    Social History    Tobacco Use      Smoking status: Every Day        Packs/day: 0.25        Years: 10.00        Pack years: 2.5        Types: Cigarettes      Smokeless tobacco: Never      Tobacco comments: 4-5 cig/day       Objective   /70   Wt 72.1 kg (159 lb)   BMI 27.29 kg/m²   Physical Exam:  Normal, gestational age-appropriate exam today        Plan   Medical Decision Making:    I have reviewed the prenatal labs and ultrasound(s) today. I have reviewed the most recent prenatal progress note(s).    Diagnosis: Supervision of high risk pregnancy    contractions  Rh NEG   Tests/Orders/Rx today: No orders of the defined types were placed in this encounter.      Medication Management: None     Topics discussed: Prenatal care milestones  kick counts and fetal movement  PIH precautions   labor signs and symptoms  U/S findings   Patient was admitted on 10/19- for  labor/contractions, symptoms stable now, no tocolytics  Rhogam on 10/10   NIPS NEG   Tests next visit: GBS testing   Next visit: 2 week(s)     Elmer Gaitan MD  Obstetrics and Gynecology  [facility]

## 2022-12-05 ENCOUNTER — ROUTINE PRENATAL (OUTPATIENT)
Dept: OBSTETRICS AND GYNECOLOGY | Facility: CLINIC | Age: 22
End: 2022-12-05

## 2022-12-05 VITALS — DIASTOLIC BLOOD PRESSURE: 60 MMHG | BODY MASS INDEX: 27.81 KG/M2 | SYSTOLIC BLOOD PRESSURE: 110 MMHG | WEIGHT: 162 LBS

## 2022-12-05 DIAGNOSIS — Z36.85 ENCOUNTER FOR ANTENATAL SCREENING FOR STREPTOCOCCUS B: Primary | ICD-10-CM

## 2022-12-05 PROCEDURE — 99213 OFFICE O/P EST LOW 20 MIN: CPT | Performed by: OBSTETRICS & GYNECOLOGY

## 2022-12-05 NOTE — PROGRESS NOTES
Chief Complaint   Patient presents with   • Routine Prenatal Visit     Prenatal visit with GBS done today. No problems or concerns.        HPI:   , 35w6d gestation reports doing well    ROS:  See Prenatal Episode/Flowsheet  /60   Wt 73.5 kg (162 lb)   BMI 27.81 kg/m²      EXAM:  EXTREMITIES:  No swelling-See Prenatal Episode/Flowsheet    ABDOMEN:  FHTs/Movement noted-See Prenatal Episode/Flowsheet    URINE GLUCOSE/PROTEIN:  See Prenatal Episode/Flowsheet    PELVIC EXAM:  See Prenatal Episode/Flowsheet  CV:  Lungs:  GYN:    MDM:    Lab Results   Component Value Date    HGB 11.2 (L) 2022    RUBELLAABIGG 1.44 2022    HEPBSAG Negative 2022    ABO AB 2022    RH Negative 2022    ABSCRN Negative 2022    JON9ZSC8 Non Reactive 2022    HEPCVIRUSABY <0.1 2022    VCL2AONY 135 2017    STREPGPB Negative 09/15/2017    URINECX >100,000 CFU/mL Mixed Jolanta Isolated 10/20/2022       U/S:US Ob Follow Up Transabdominal Approach (2022 10:19)      1. IUP 35w6d  2. Routine care   3. GBS done  4. Anemia: taking FE and PNV

## 2022-12-10 LAB
CLINDAMYCIN ISLT KB: ABNORMAL
GP B STREP DNA SPEC QL NAA+PROBE: POSITIVE
ORGANISM ID: ABNORMAL

## 2022-12-12 ENCOUNTER — ROUTINE PRENATAL (OUTPATIENT)
Dept: OBSTETRICS AND GYNECOLOGY | Facility: CLINIC | Age: 22
End: 2022-12-12

## 2022-12-12 VITALS — WEIGHT: 161 LBS | DIASTOLIC BLOOD PRESSURE: 64 MMHG | BODY MASS INDEX: 27.64 KG/M2 | SYSTOLIC BLOOD PRESSURE: 102 MMHG

## 2022-12-12 DIAGNOSIS — Z67.91 RH NEGATIVE STATUS DURING PREGNANCY IN THIRD TRIMESTER: ICD-10-CM

## 2022-12-12 DIAGNOSIS — Z34.93 PRENATAL CARE IN THIRD TRIMESTER: Primary | ICD-10-CM

## 2022-12-12 DIAGNOSIS — O99.820 GBS (GROUP B STREPTOCOCCUS CARRIER), +RV CULTURE, CURRENTLY PREGNANT: ICD-10-CM

## 2022-12-12 DIAGNOSIS — O26.893 RH NEGATIVE STATUS DURING PREGNANCY IN THIRD TRIMESTER: ICD-10-CM

## 2022-12-12 PROCEDURE — 99213 OFFICE O/P EST LOW 20 MIN: CPT | Performed by: OBSTETRICS & GYNECOLOGY

## 2022-12-15 ENCOUNTER — TELEPHONE (OUTPATIENT)
Dept: OBSTETRICS AND GYNECOLOGY | Facility: CLINIC | Age: 22
End: 2022-12-15

## 2022-12-15 ENCOUNTER — HOSPITAL ENCOUNTER (OUTPATIENT)
Facility: HOSPITAL | Age: 22
Discharge: HOME | End: 2022-12-15
Attending: MIDWIFE | Admitting: MIDWIFE

## 2022-12-15 VITALS
BODY MASS INDEX: 28.17 KG/M2 | TEMPERATURE: 98.9 F | SYSTOLIC BLOOD PRESSURE: 105 MMHG | WEIGHT: 164.1 LBS | HEART RATE: 100 BPM | DIASTOLIC BLOOD PRESSURE: 62 MMHG

## 2022-12-15 LAB
BILIRUB BLD-MCNC: NEGATIVE MG/DL
CLARITY, POC: CLEAR
COLOR UR: YELLOW
GLUCOSE UR STRIP-MCNC: NEGATIVE MG/DL
KETONES UR QL: NEGATIVE
LEUKOCYTE EST, POC: ABNORMAL
NITRITE UR-MCNC: NEGATIVE MG/ML
PH UR: 7.5 [PH] (ref 5–8)
PROT UR STRIP-MCNC: NEGATIVE MG/DL
RBC # UR STRIP: NEGATIVE /UL
SP GR UR: 1.01 (ref 1–1.03)
UROBILINOGEN UR QL: NORMAL

## 2022-12-15 PROCEDURE — 59025 FETAL NON-STRESS TEST: CPT

## 2022-12-15 PROCEDURE — 81002 URINALYSIS NONAUTO W/O SCOPE: CPT | Performed by: MIDWIFE

## 2022-12-15 PROCEDURE — 59025 FETAL NON-STRESS TEST: CPT | Performed by: MIDWIFE

## 2022-12-15 PROCEDURE — G0463 HOSPITAL OUTPT CLINIC VISIT: HCPCS

## 2022-12-15 NOTE — TELEPHONE ENCOUNTER
PT IS 37 WEEKS AND STATES SHE IS HAVING A PAIN UNDER HER STOMACH AND SHARP STABBING PAIN IN HER HIPS GOING TO HER LEGS.     PLEASE ADVISE PT, SHE CAN BE REACHED [phone].    Problem: Ventilation, Mechanical Invasive (NICU)  Goal: Signs and Symptoms of Listed Potential Problems Will be Absent, Minimized or Managed (Ventilation, Mechanical Invasive)  Signs and symptoms of listed potential problems will be absent, minimized or managed by discharge/transition of care (reference Ventilation, Mechanical Invasive (NICU) CPG).   Outcome: Ongoing (interventions implemented as appropriate)  Maintained ventilator settings. Fio2 requirements increased this pm. Endotracheal tube was suctioned four times for thick, yellow secretions. Pt currently stable with acceptable respiratory status.

## 2022-12-15 NOTE — NON STRESS TEST
Triage Note - Nursing Documentation  Labor and Delivery Admission Log    Summer Elmore  : 2000  MRN: [id number]  CSN: [id number]    Date in / Time in:  12/15/2022  Time in: 160    Date out / Time out:    Time out: 1711    Nurse: Elisa Soni, RN    Patient Info: She is a 22 y.o. year old  at 37w2d with an PRIYA of 1/3/2023, by Ultrasound who was seen on the [facility].    Chief Complaint:   Chief Complaint   Patient presents with   • Pain     Top of vagina, across hips and down legs. Making legs tingly and numb       Provider Instructions / Disposition: VE 1/30/-3, discharged home. Follows up on Monday.    Patient Active Problem List   Diagnosis   (none) - all problems resolved or deleted       NST Documentation (Only applicable > 32 weeks): Interpretation A  Nonstress Test Interpretation A: Reactive (12/15/22 1717 : Elisa Soni, RN)

## 2022-12-16 PROBLEM — Z67.91 RH NEGATIVE STATUS DURING PREGNANCY IN THIRD TRIMESTER: Status: ACTIVE | Noted: 2022-12-16

## 2022-12-16 PROBLEM — O99.820 GBS (GROUP B STREPTOCOCCUS CARRIER), +RV CULTURE, CURRENTLY PREGNANT: Status: ACTIVE | Noted: 2022-12-16

## 2022-12-16 PROBLEM — O26.893 RH NEGATIVE STATUS DURING PREGNANCY IN THIRD TRIMESTER: Status: ACTIVE | Noted: 2022-12-16

## 2022-12-16 NOTE — PROGRESS NOTES
Prenatal Care Visit    Subjective   Chief Complaint   Patient presents with   • Routine Prenatal Visit     No complaints       History:   Summer is a  currently at 36w6d who presents for a prenatal care visit today.    No major issues.    Social History    Tobacco Use      Smoking status: Every Day        Packs/day: 0.25        Years: 10.00        Pack years: 2.5        Types: Cigarettes      Smokeless tobacco: Never      Tobacco comments: 4-5 cig/day       Objective   /64   Wt 73 kg (161 lb)   BMI 27.64 kg/m²   Physical Exam:  Normal, gestational age-appropriate exam today        Plan   Medical Decision Making:    I have reviewed the prenatal labs and ultrasound(s) today. I have reviewed the most recent prenatal progress note(s).    Diagnosis: Supervision of high risk pregnancy    contractions  Rh NEG  GBS +   Tests/Orders/Rx today: No orders of the defined types were placed in this encounter.      Medication Management: None     Topics discussed: Prenatal care milestones  kick counts and fetal movement  PIH precautions   labor signs and symptoms  U/S findings   Rhogam on 10/10   NIPS NEG  GBS   Tests next visit: none   Next visit: 1 week(s)     Elmer Gaitan MD  Obstetrics and Gynecology  [facility]

## 2022-12-19 ENCOUNTER — PREP FOR SURGERY (OUTPATIENT)
Dept: OTHER | Facility: HOSPITAL | Age: 22
End: 2022-12-19

## 2022-12-19 ENCOUNTER — ROUTINE PRENATAL (OUTPATIENT)
Dept: OBSTETRICS AND GYNECOLOGY | Facility: CLINIC | Age: 22
End: 2022-12-19

## 2022-12-19 VITALS — DIASTOLIC BLOOD PRESSURE: 70 MMHG | BODY MASS INDEX: 27.46 KG/M2 | SYSTOLIC BLOOD PRESSURE: 112 MMHG | WEIGHT: 160 LBS

## 2022-12-19 DIAGNOSIS — O26.893 RH NEGATIVE STATUS DURING PREGNANCY IN THIRD TRIMESTER: ICD-10-CM

## 2022-12-19 DIAGNOSIS — Z34.83 ENCOUNTER FOR SUPERVISION OF OTHER NORMAL PREGNANCY IN THIRD TRIMESTER: Primary | ICD-10-CM

## 2022-12-19 DIAGNOSIS — Z67.91 RH NEGATIVE STATUS DURING PREGNANCY IN THIRD TRIMESTER: ICD-10-CM

## 2022-12-19 DIAGNOSIS — Z34.90 ENCOUNTER FOR INDUCTION OF LABOR: Primary | ICD-10-CM

## 2022-12-19 DIAGNOSIS — O99.820 GBS (GROUP B STREPTOCOCCUS CARRIER), +RV CULTURE, CURRENTLY PREGNANT: ICD-10-CM

## 2022-12-19 PROCEDURE — 99213 OFFICE O/P EST LOW 20 MIN: CPT | Performed by: MIDWIFE

## 2022-12-19 RX ORDER — SODIUM CHLORIDE 0.9 % (FLUSH) 0.9 %
10 SYRINGE (ML) INJECTION EVERY 12 HOURS SCHEDULED
Status: CANCELLED | OUTPATIENT
Start: 2022-12-19

## 2022-12-19 RX ORDER — ONDANSETRON 2 MG/ML
4 INJECTION INTRAMUSCULAR; INTRAVENOUS EVERY 6 HOURS PRN
Status: CANCELLED | OUTPATIENT
Start: 2022-12-19

## 2022-12-19 RX ORDER — CARBOPROST TROMETHAMINE 250 UG/ML
250 INJECTION, SOLUTION INTRAMUSCULAR ONCE AS NEEDED
Status: CANCELLED | OUTPATIENT
Start: 2022-12-19 | End: 2022-12-20

## 2022-12-19 RX ORDER — SODIUM CHLORIDE 0.9 % (FLUSH) 0.9 %
1-10 SYRINGE (ML) INJECTION AS NEEDED
Status: CANCELLED | OUTPATIENT
Start: 2022-12-19

## 2022-12-19 RX ORDER — ACETAMINOPHEN 325 MG/1
650 TABLET ORAL EVERY 4 HOURS PRN
Status: CANCELLED | OUTPATIENT
Start: 2022-12-19

## 2022-12-19 RX ORDER — OXYTOCIN/0.9 % SODIUM CHLORIDE 30/500 ML
250 PLASTIC BAG, INJECTION (ML) INTRAVENOUS CONTINUOUS
Status: CANCELLED | OUTPATIENT
Start: 2022-12-19 | End: 2022-12-19

## 2022-12-19 RX ORDER — OXYTOCIN/0.9 % SODIUM CHLORIDE 30/500 ML
999 PLASTIC BAG, INJECTION (ML) INTRAVENOUS ONCE
Status: CANCELLED | OUTPATIENT
Start: 2022-12-19 | End: 2022-12-19

## 2022-12-19 RX ORDER — METHYLERGONOVINE MALEATE 0.2 MG/ML
200 INJECTION INTRAVENOUS ONCE AS NEEDED
Status: CANCELLED | OUTPATIENT
Start: 2022-12-19 | End: 2022-12-20

## 2022-12-19 RX ORDER — ACETAMINOPHEN 325 MG/1
650 TABLET ORAL ONCE AS NEEDED
Status: CANCELLED | OUTPATIENT
Start: 2022-12-19

## 2022-12-19 RX ORDER — MAGNESIUM CARB/ALUMINUM HYDROX 105-160MG
30 TABLET,CHEWABLE ORAL ONCE
Status: CANCELLED | OUTPATIENT
Start: 2022-12-19 | End: 2022-12-19

## 2022-12-19 RX ORDER — OXYTOCIN/0.9 % SODIUM CHLORIDE 30/500 ML
2-20 PLASTIC BAG, INJECTION (ML) INTRAVENOUS
Status: CANCELLED | OUTPATIENT
Start: 2022-12-19

## 2022-12-19 RX ORDER — PROMETHAZINE HYDROCHLORIDE 12.5 MG/1
12.5 SUPPOSITORY RECTAL EVERY 6 HOURS PRN
Status: CANCELLED | OUTPATIENT
Start: 2022-12-19

## 2022-12-19 RX ORDER — ONDANSETRON 2 MG/ML
4 INJECTION INTRAMUSCULAR; INTRAVENOUS ONCE AS NEEDED
Status: CANCELLED | OUTPATIENT
Start: 2022-12-19

## 2022-12-19 RX ORDER — ONDANSETRON 4 MG/1
4 TABLET, FILM COATED ORAL EVERY 6 HOURS PRN
Status: CANCELLED | OUTPATIENT
Start: 2022-12-19

## 2022-12-19 RX ORDER — SODIUM CHLORIDE, SODIUM LACTATE, POTASSIUM CHLORIDE, CALCIUM CHLORIDE 600; 310; 30; 20 MG/100ML; MG/100ML; MG/100ML; MG/100ML
125 INJECTION, SOLUTION INTRAVENOUS CONTINUOUS
Status: CANCELLED | OUTPATIENT
Start: 2022-12-19

## 2022-12-19 RX ORDER — PROMETHAZINE HYDROCHLORIDE 12.5 MG/1
12.5 TABLET ORAL EVERY 6 HOURS PRN
Status: CANCELLED | OUTPATIENT
Start: 2022-12-19

## 2022-12-19 RX ORDER — ONDANSETRON 4 MG/1
4 TABLET, FILM COATED ORAL ONCE AS NEEDED
Status: CANCELLED | OUTPATIENT
Start: 2022-12-19

## 2022-12-19 RX ORDER — MISOPROSTOL 200 UG/1
800 TABLET ORAL AS NEEDED
Status: CANCELLED | OUTPATIENT
Start: 2022-12-19

## 2022-12-19 RX ORDER — LIDOCAINE HYDROCHLORIDE 10 MG/ML
5 INJECTION, SOLUTION EPIDURAL; INFILTRATION; INTRACAUDAL; PERINEURAL AS NEEDED
Status: CANCELLED | OUTPATIENT
Start: 2022-12-19

## 2022-12-19 NOTE — PROGRESS NOTES
Chief Complaint   Patient presents with   • Routine Prenatal Visit     Wants to discuss induction        HPI: Summer is a  currently at 37w6d here for prenatal visit who reports the following:  Baby is active. She has irregular ctxs. She went to  last week for sciatic pain. She wants to be induced next week .                EXAM:     Vitals:    22 1141   BP: 112/70      Abdomen:   See prenatal flowsheet as noted and reviewed, soft, nontender   Pelvic:  See prenatal flowsheet as noted and reviewed 3/70/-2   Urine:  See prenatal flowsheet as noted and reviewed    Lab Results   Component Value Date    ABO AB 2022    RH Negative 2022    ABSCRN Negative 2022       MDM:  Impression: Supervision of low risk pregnancy  GBS (+)  Rh negative  Anemia in pregnancy   Tests done today: none   Topics discussed: kick counts and fetal movement  labor signs and symptoms  Pitocin Induction    Reviewed OB labs   Tests next visit: none                RTO:                        1 weeks    This note was electronically signed.  INGE Nielson  2022

## 2022-12-21 ENCOUNTER — HOSPITAL ENCOUNTER (OUTPATIENT)
Facility: HOSPITAL | Age: 22
Discharge: HOME | End: 2022-12-21
Attending: OBSTETRICS & GYNECOLOGY | Admitting: OBSTETRICS & GYNECOLOGY

## 2022-12-21 VITALS
DIASTOLIC BLOOD PRESSURE: 55 MMHG | BODY MASS INDEX: 27.54 KG/M2 | SYSTOLIC BLOOD PRESSURE: 106 MMHG | HEART RATE: 90 BPM | RESPIRATION RATE: 18 BRPM | OXYGEN SATURATION: 97 % | HEIGHT: 64 IN | TEMPERATURE: 98.5 F | WEIGHT: 161.3 LBS

## 2022-12-21 LAB
BILIRUB BLD-MCNC: NEGATIVE MG/DL
CLARITY, POC: ABNORMAL
COLOR UR: YELLOW
GLUCOSE UR STRIP-MCNC: NEGATIVE MG/DL
KETONES UR QL: NEGATIVE
LEUKOCYTE EST, POC: ABNORMAL
NITRITE UR-MCNC: NEGATIVE MG/ML
PH UR: 7.5 [PH] (ref 5–8)
PROT UR STRIP-MCNC: NEGATIVE MG/DL
RBC # UR STRIP: ABNORMAL /UL
SP GR UR: 1.01 (ref 1–1.03)
UROBILINOGEN UR QL: NORMAL

## 2022-12-21 PROCEDURE — G0463 HOSPITAL OUTPT CLINIC VISIT: HCPCS

## 2022-12-21 PROCEDURE — 59025 FETAL NON-STRESS TEST: CPT | Performed by: OBSTETRICS & GYNECOLOGY

## 2022-12-21 PROCEDURE — 59025 FETAL NON-STRESS TEST: CPT

## 2022-12-21 PROCEDURE — 81002 URINALYSIS NONAUTO W/O SCOPE: CPT | Performed by: OBSTETRICS & GYNECOLOGY

## 2022-12-21 NOTE — NON STRESS TEST
Triage Note - Nursing Documentation  Labor and Delivery Admission Log    Summer Elmore  : 2000  MRN: [id number]  CSN: [id number]    Date in / Time in:  2022  Time in:     Date out / Time out:    Time out:   Nurse: Lauren Melgar, RN    Patient Info: She is a 22 y.o. year old  at 38w1d with an PRIYA of 1/3/2023, by Ultrasound who was seen on the [facility] Labor Gross.    Chief Complaint:   Chief Complaint   Patient presents with   • Contractions     Started at 1p        Provider Instructions / Disposition: to labor gross with c/o ctx approx every 15-18 minutes, VE 3/70/-2 no blood or fluid noted. EFM shows irriatibility and one large ctx on stay. Reactive NST. FOLLOW UP AS SCHEDULED    Patient Active Problem List   Diagnosis   • GBS (group B Streptococcus carrier), +RV culture, currently pregnant   • Rh negative status during pregnancy in third trimester       NST Documentation (Only applicable > 32 weeks): Interpretation A  Nonstress Test Interpretation A: Reactive (22 : Lauren Melgar, RN)

## 2022-12-23 ENCOUNTER — ANESTHESIA EVENT (OUTPATIENT)
Dept: LABOR AND DELIVERY | Facility: HOSPITAL | Age: 22
End: 2022-12-23
Payer: COMMERCIAL

## 2022-12-23 ENCOUNTER — HOSPITAL ENCOUNTER (INPATIENT)
Facility: HOSPITAL | Age: 22
LOS: 1 days | Discharge: HOME | End: 2022-12-24
Attending: OBSTETRICS & GYNECOLOGY | Admitting: MIDWIFE
Payer: COMMERCIAL

## 2022-12-23 ENCOUNTER — ANESTHESIA (OUTPATIENT)
Dept: LABOR AND DELIVERY | Facility: HOSPITAL | Age: 22
End: 2022-12-23
Payer: COMMERCIAL

## 2022-12-23 DIAGNOSIS — Z34.90 ENCOUNTER FOR INDUCTION OF LABOR: ICD-10-CM

## 2022-12-23 LAB
ABO GROUP BLD: NORMAL
BASOPHILS # BLD AUTO: 0.05 10*3/MM3 (ref 0–0.2)
BASOPHILS NFR BLD AUTO: 0.6 % (ref 0–1.5)
BLD GP AB SCN SERPL QL: NEGATIVE
DEPRECATED RDW RBC AUTO: 48.6 FL (ref 37–54)
EOSINOPHIL # BLD AUTO: 0.1 10*3/MM3 (ref 0–0.4)
EOSINOPHIL NFR BLD AUTO: 1.2 % (ref 0.3–6.2)
ERYTHROCYTE [DISTWIDTH] IN BLOOD BY AUTOMATED COUNT: 13.6 % (ref 12.3–15.4)
HCT VFR BLD AUTO: 39.1 % (ref 34–46.6)
HGB BLD-MCNC: 13.3 G/DL (ref 12–15.9)
IMM GRANULOCYTES # BLD AUTO: 0.05 10*3/MM3 (ref 0–0.05)
IMM GRANULOCYTES NFR BLD AUTO: 0.6 % (ref 0–0.5)
LYMPHOCYTES # BLD AUTO: 0.89 10*3/MM3 (ref 0.7–3.1)
LYMPHOCYTES NFR BLD AUTO: 10.7 % (ref 19.6–45.3)
MCH RBC QN AUTO: 32.6 PG (ref 26.6–33)
MCHC RBC AUTO-ENTMCNC: 34 G/DL (ref 31.5–35.7)
MCV RBC AUTO: 95.8 FL (ref 79–97)
MONOCYTES # BLD AUTO: 0.48 10*3/MM3 (ref 0.1–0.9)
MONOCYTES NFR BLD AUTO: 5.8 % (ref 5–12)
NEUTROPHILS NFR BLD AUTO: 6.72 10*3/MM3 (ref 1.7–7)
NEUTROPHILS NFR BLD AUTO: 81.1 % (ref 42.7–76)
NRBC BLD AUTO-RTO: 0 /100 WBC (ref 0–0.2)
PLATELET # BLD AUTO: 131 10*3/MM3 (ref 140–450)
PMV BLD AUTO: 12.4 FL (ref 6–12)
RBC # BLD AUTO: 4.08 10*6/MM3 (ref 3.77–5.28)
RH BLD: NEGATIVE
SARS-COV-2 RNA PNL SPEC NAA+PROBE: NOT DETECTED
T&S EXPIRATION DATE: NORMAL
WBC NRBC COR # BLD: 8.29 10*3/MM3 (ref 3.4–10.8)

## 2022-12-23 PROCEDURE — 86900 BLOOD TYPING SEROLOGIC ABO: CPT | Performed by: MIDWIFE

## 2022-12-23 PROCEDURE — 51701 INSERT BLADDER CATHETER: CPT

## 2022-12-23 PROCEDURE — C1755 CATHETER, INTRASPINAL: HCPCS | Performed by: NURSE ANESTHETIST, CERTIFIED REGISTERED

## 2022-12-23 PROCEDURE — 25010000002 VANCOMYCIN 1 G RECONSTITUTED SOLUTION: Performed by: MIDWIFE

## 2022-12-23 PROCEDURE — 87635 SARS-COV-2 COVID-19 AMP PRB: CPT | Performed by: MIDWIFE

## 2022-12-23 PROCEDURE — 86901 BLOOD TYPING SEROLOGIC RH(D): CPT | Performed by: MIDWIFE

## 2022-12-23 PROCEDURE — 85025 COMPLETE CBC W/AUTO DIFF WBC: CPT | Performed by: MIDWIFE

## 2022-12-23 PROCEDURE — 86850 RBC ANTIBODY SCREEN: CPT | Performed by: MIDWIFE

## 2022-12-23 PROCEDURE — 59410 OBSTETRICAL CARE: CPT | Performed by: OBSTETRICS & GYNECOLOGY

## 2022-12-23 RX ORDER — ONDANSETRON 2 MG/ML
4 INJECTION INTRAMUSCULAR; INTRAVENOUS ONCE AS NEEDED
Status: DISCONTINUED | OUTPATIENT
Start: 2022-12-23 | End: 2022-12-23 | Stop reason: HOSPADM

## 2022-12-23 RX ORDER — MISOPROSTOL 200 UG/1
800 TABLET ORAL AS NEEDED
Status: DISCONTINUED | OUTPATIENT
Start: 2022-12-23 | End: 2022-12-23 | Stop reason: HOSPADM

## 2022-12-23 RX ORDER — PROMETHAZINE HYDROCHLORIDE 25 MG/1
25 TABLET ORAL EVERY 6 HOURS PRN
Status: DISCONTINUED | OUTPATIENT
Start: 2022-12-23 | End: 2022-12-24 | Stop reason: HOSPADM

## 2022-12-23 RX ORDER — PROMETHAZINE HYDROCHLORIDE 12.5 MG/1
12.5 TABLET ORAL EVERY 6 HOURS PRN
Status: DISCONTINUED | OUTPATIENT
Start: 2022-12-23 | End: 2022-12-23 | Stop reason: HOSPADM

## 2022-12-23 RX ORDER — EPHEDRINE SULFATE 5 MG/ML
5 INJECTION INTRAVENOUS
Status: DISCONTINUED | OUTPATIENT
Start: 2022-12-23 | End: 2022-12-23 | Stop reason: HOSPADM

## 2022-12-23 RX ORDER — SODIUM CHLORIDE, SODIUM LACTATE, POTASSIUM CHLORIDE, CALCIUM CHLORIDE 600; 310; 30; 20 MG/100ML; MG/100ML; MG/100ML; MG/100ML
125 INJECTION, SOLUTION INTRAVENOUS CONTINUOUS
Status: DISCONTINUED | OUTPATIENT
Start: 2022-12-23 | End: 2022-12-24 | Stop reason: HOSPADM

## 2022-12-23 RX ORDER — CARBOPROST TROMETHAMINE 250 UG/ML
250 INJECTION, SOLUTION INTRAMUSCULAR ONCE AS NEEDED
Status: DISCONTINUED | OUTPATIENT
Start: 2022-12-23 | End: 2022-12-23 | Stop reason: HOSPADM

## 2022-12-23 RX ORDER — BISACODYL 10 MG
10 SUPPOSITORY, RECTAL RECTAL DAILY PRN
Status: DISCONTINUED | OUTPATIENT
Start: 2022-12-24 | End: 2022-12-24 | Stop reason: HOSPADM

## 2022-12-23 RX ORDER — SODIUM CHLORIDE 0.9 % (FLUSH) 0.9 %
1-10 SYRINGE (ML) INJECTION AS NEEDED
Status: DISCONTINUED | OUTPATIENT
Start: 2022-12-23 | End: 2022-12-24 | Stop reason: HOSPADM

## 2022-12-23 RX ORDER — DIPHENHYDRAMINE HCL 25 MG
25 CAPSULE ORAL NIGHTLY PRN
Status: DISCONTINUED | OUTPATIENT
Start: 2022-12-23 | End: 2022-12-24 | Stop reason: HOSPADM

## 2022-12-23 RX ORDER — SODIUM CHLORIDE 0.9 % (FLUSH) 0.9 %
10 SYRINGE (ML) INJECTION EVERY 12 HOURS SCHEDULED
Status: DISCONTINUED | OUTPATIENT
Start: 2022-12-23 | End: 2022-12-23 | Stop reason: HOSPADM

## 2022-12-23 RX ORDER — HYDROCORTISONE 25 MG/G
1 CREAM TOPICAL AS NEEDED
Status: DISCONTINUED | OUTPATIENT
Start: 2022-12-23 | End: 2022-12-24 | Stop reason: HOSPADM

## 2022-12-23 RX ORDER — ONDANSETRON 2 MG/ML
4 INJECTION INTRAMUSCULAR; INTRAVENOUS EVERY 6 HOURS PRN
Status: DISCONTINUED | OUTPATIENT
Start: 2022-12-23 | End: 2022-12-23 | Stop reason: HOSPADM

## 2022-12-23 RX ORDER — PROMETHAZINE HYDROCHLORIDE 12.5 MG/1
12.5 SUPPOSITORY RECTAL EVERY 6 HOURS PRN
Status: DISCONTINUED | OUTPATIENT
Start: 2022-12-23 | End: 2022-12-23 | Stop reason: HOSPADM

## 2022-12-23 RX ORDER — OXYTOCIN/0.9 % SODIUM CHLORIDE 30/500 ML
2-20 PLASTIC BAG, INJECTION (ML) INTRAVENOUS
Status: DISCONTINUED | OUTPATIENT
Start: 2022-12-23 | End: 2022-12-23 | Stop reason: HOSPADM

## 2022-12-23 RX ORDER — ONDANSETRON 4 MG/1
4 TABLET, FILM COATED ORAL ONCE AS NEEDED
Status: DISCONTINUED | OUTPATIENT
Start: 2022-12-23 | End: 2022-12-23 | Stop reason: HOSPADM

## 2022-12-23 RX ORDER — ACETAMINOPHEN 325 MG/1
650 TABLET ORAL EVERY 4 HOURS PRN
Status: DISCONTINUED | OUTPATIENT
Start: 2022-12-23 | End: 2022-12-23 | Stop reason: HOSPADM

## 2022-12-23 RX ORDER — ONDANSETRON 2 MG/ML
4 INJECTION INTRAMUSCULAR; INTRAVENOUS EVERY 6 HOURS PRN
Status: DISCONTINUED | OUTPATIENT
Start: 2022-12-23 | End: 2022-12-24 | Stop reason: HOSPADM

## 2022-12-23 RX ORDER — METHYLERGONOVINE MALEATE 0.2 MG/ML
200 INJECTION INTRAVENOUS ONCE AS NEEDED
Status: DISCONTINUED | OUTPATIENT
Start: 2022-12-23 | End: 2022-12-23 | Stop reason: HOSPADM

## 2022-12-23 RX ORDER — OXYTOCIN/0.9 % SODIUM CHLORIDE 30/500 ML
999 PLASTIC BAG, INJECTION (ML) INTRAVENOUS ONCE
Status: COMPLETED | OUTPATIENT
Start: 2022-12-23 | End: 2022-12-23

## 2022-12-23 RX ORDER — PRENATAL VIT/IRON FUM/FOLIC AC 27MG-0.8MG
1 TABLET ORAL DAILY
Status: DISCONTINUED | OUTPATIENT
Start: 2022-12-24 | End: 2022-12-24 | Stop reason: HOSPADM

## 2022-12-23 RX ORDER — ONDANSETRON 4 MG/1
4 TABLET, FILM COATED ORAL EVERY 8 HOURS PRN
Status: DISCONTINUED | OUTPATIENT
Start: 2022-12-23 | End: 2022-12-24 | Stop reason: HOSPADM

## 2022-12-23 RX ORDER — ACETAMINOPHEN 325 MG/1
650 TABLET ORAL ONCE AS NEEDED
Status: DISCONTINUED | OUTPATIENT
Start: 2022-12-23 | End: 2022-12-23 | Stop reason: HOSPADM

## 2022-12-23 RX ORDER — SODIUM CHLORIDE 0.9 % (FLUSH) 0.9 %
1-10 SYRINGE (ML) INJECTION AS NEEDED
Status: DISCONTINUED | OUTPATIENT
Start: 2022-12-23 | End: 2022-12-23 | Stop reason: HOSPADM

## 2022-12-23 RX ORDER — HYDROCODONE BITARTRATE AND ACETAMINOPHEN 5; 325 MG/1; MG/1
1 TABLET ORAL EVERY 4 HOURS PRN
Status: DISCONTINUED | OUTPATIENT
Start: 2022-12-23 | End: 2022-12-24 | Stop reason: HOSPADM

## 2022-12-23 RX ORDER — ONDANSETRON 4 MG/1
4 TABLET, FILM COATED ORAL EVERY 6 HOURS PRN
Status: DISCONTINUED | OUTPATIENT
Start: 2022-12-23 | End: 2022-12-23 | Stop reason: HOSPADM

## 2022-12-23 RX ORDER — TRISODIUM CITRATE DIHYDRATE AND CITRIC ACID MONOHYDRATE 500; 334 MG/5ML; MG/5ML
30 SOLUTION ORAL ONCE
Status: DISCONTINUED | OUTPATIENT
Start: 2022-12-23 | End: 2022-12-23 | Stop reason: HOSPADM

## 2022-12-23 RX ORDER — DOCUSATE SODIUM 100 MG/1
100 CAPSULE, LIQUID FILLED ORAL 2 TIMES DAILY PRN
Status: DISCONTINUED | OUTPATIENT
Start: 2022-12-23 | End: 2022-12-24 | Stop reason: HOSPADM

## 2022-12-23 RX ORDER — PROMETHAZINE HYDROCHLORIDE 12.5 MG/1
12.5 SUPPOSITORY RECTAL EVERY 6 HOURS PRN
Status: DISCONTINUED | OUTPATIENT
Start: 2022-12-23 | End: 2022-12-24 | Stop reason: HOSPADM

## 2022-12-23 RX ORDER — MAGNESIUM CARB/ALUMINUM HYDROX 105-160MG
30 TABLET,CHEWABLE ORAL ONCE
Status: COMPLETED | OUTPATIENT
Start: 2022-12-23 | End: 2022-12-23

## 2022-12-23 RX ORDER — LIDOCAINE HYDROCHLORIDE 10 MG/ML
5 INJECTION, SOLUTION EPIDURAL; INFILTRATION; INTRACAUDAL; PERINEURAL AS NEEDED
Status: DISCONTINUED | OUTPATIENT
Start: 2022-12-23 | End: 2022-12-23 | Stop reason: HOSPADM

## 2022-12-23 RX ORDER — OXYTOCIN/0.9 % SODIUM CHLORIDE 30/500 ML
250 PLASTIC BAG, INJECTION (ML) INTRAVENOUS CONTINUOUS
Status: ACTIVE | OUTPATIENT
Start: 2022-12-23 | End: 2022-12-23

## 2022-12-23 RX ORDER — HYDROCODONE BITARTRATE AND ACETAMINOPHEN 7.5; 325 MG/1; MG/1
1 TABLET ORAL EVERY 4 HOURS PRN
Status: DISCONTINUED | OUTPATIENT
Start: 2022-12-23 | End: 2022-12-24 | Stop reason: HOSPADM

## 2022-12-23 RX ORDER — IBUPROFEN 600 MG/1
600 TABLET ORAL EVERY 6 HOURS PRN
Status: DISCONTINUED | OUTPATIENT
Start: 2022-12-23 | End: 2022-12-24 | Stop reason: HOSPADM

## 2022-12-23 RX ADMIN — MINERAL OIL 30 ML: 1000 SOLUTION ORAL at 16:27

## 2022-12-23 RX ADMIN — SODIUM CHLORIDE 1000 MG: 900 INJECTION, SOLUTION INTRAVENOUS at 13:08

## 2022-12-23 RX ADMIN — Medication 650 ML/HR: at 16:39

## 2022-12-23 RX ADMIN — IBUPROFEN 600 MG: 600 TABLET ORAL at 20:34

## 2022-12-23 RX ADMIN — DOCUSATE SODIUM 100 MG: 100 CAPSULE, LIQUID FILLED ORAL at 20:35

## 2022-12-23 RX ADMIN — SODIUM CHLORIDE, POTASSIUM CHLORIDE, SODIUM LACTATE AND CALCIUM CHLORIDE 125 ML/HR: 600; 310; 30; 20 INJECTION, SOLUTION INTRAVENOUS at 13:03

## 2022-12-23 RX ADMIN — Medication 2 MILLI-UNITS/MIN: at 13:01

## 2022-12-23 RX ADMIN — Medication 10 ML/HR: at 16:18

## 2022-12-23 RX ADMIN — SODIUM CHLORIDE, POTASSIUM CHLORIDE, SODIUM LACTATE AND CALCIUM CHLORIDE 1000 ML: 600; 310; 30; 20 INJECTION, SOLUTION INTRAVENOUS at 15:21

## 2022-12-23 NOTE — L&D DELIVERY NOTE
[facility]  Vaginal Delivery Note    Delivery details     Delivery: Vaginal, Spontaneous     YOB: 2024    Time of Birth: 4:32 PM      Anesthesia:  Epidural   Delivering clinician:  Laly Damian M.D.   Forceps?   No   Vacuum? No    Shoulder dystocia present: No      Delivery narrative:  The patient progressed to complete and pushing.  With good maternal effort she delivered a viable female infant.  The head presented in the NELI presentation.  There was no nuchal cord present.  The anterior fetal shoulder was then easily delivered with a gentle downward motion followed by the posterior fetal shoulder, followed by the remainder of the infant without difficulty.  The infant was immediately vigorous.  The oropharynx and nares were bulb-suction.  The infant was placed on the maternal abdomen and the cord was clamped roughly 45 seconds following delivery.  Cord blood was then collected.  The placenta then delivered spontaneously intact, and a three vessel cord was noted.  Uterine massage and Pitocin 20 units IV was given until the fundus was firm.  The cervix, vagina, perineum, and rectum were carefully inspected for lacerations.  There was a first-degree midline perineal abrasion.  No suture was required.   Counts for needles, sponges, laps and instruments were correct at the end of the delivery.  There were no major complications.  Mother and baby were stable at the end of the delivery.    Infant details    Findings: female  infant     Infant observations: Weight: 3307 g (7 lb 4.7 oz)   Length: 20  in   Apgars: 8  @ 1 minute /    9  @ 5 minutes     Placenta, Cord, and Fluid    Placenta delivered  Spontaneous  at   12/23/2022  4:39 PM         Nuchal Cord?  no   Cord blood obtained: Yes      Repair    Episiotomy: None    Lacerations: Yes  Laceration Information  Laceration Repaired?   Perineal:  Superficial abrasion   Periurethral:       Labial:       Sulcus:       Vaginal:       Cervical:             Estimated Blood Loss:  500 ml       Complications  none    Disposition  Mother to Remain in LD  in stable condition currently.  Baby to remains with mom  in stable condition currently.      Laly Damian MD  12/23/22  17:31 EST

## 2022-12-23 NOTE — H&P
[facility] (Late Entry)  Summer Elmore  : 2000  MRN: [id number]  CSN: [id number]    History and Physical  Summer Elmore is a 22 y.o. year old  with an Estimated Date of Delivery: 1/3/23 currently at 38w3d presenting with spontaneous rupture of membranes at 8 AM.  Patient presented to labor and delivery.  She was not having any significant contractions.  Patient had been seen on labor and delivery last night.  Patient's GBS is positive.  She does have known penicillin allergy.    Prenatal care has been with [facility].  It has been benign other than iron deficiency anemia.    History  OB History    Para Term  AB Living   2 1 1 0 0 1   SAB IAB Ectopic Molar Multiple Live Births   0 0 0 0 0 1      # Outcome Date GA Lbr Nomi/2nd Weight Sex Delivery Anes PTL Lv   2 Current            1 Term 10/12/17 39w0d / 01:23 3175 g (7 lb) F Vag-Spont EPI N GURPREET      Name: KITTY HAJI      Apgar1: 9  Apgar5: 10     History reviewed. No pertinent past medical history.  No current facility-administered medications on file prior to encounter.     Current Outpatient Medications on File Prior to Encounter   Medication Sig Dispense Refill   • famotidine (Pepcid) 20 MG tablet Take 1 tablet by mouth 2 (Two) Times a Day As Needed for Heartburn. 60 tablet 4   • ferrous sulfate 324 (65 Fe) MG tablet delayed-release EC tablet Take 1 tablet by mouth 2 (Two) Times a Day With Meals. 60 tablet 12   • metoclopramide (Reglan) 10 MG tablet Take 1 tablet by mouth 4 (Four) Times a Day Before Meals & at Bedtime. 120 tablet 2   • Prenatal Vit-Fe Fumarate-FA (PRENATAL VITAMIN PO) Take 1 tablet by mouth Daily.       Allergies   Allergen Reactions   • Morphine And Related Other (See Comments)     Pt states caused \"burning all over\"     • Oxycodone Nausea And Vomiting   • Amoxicillin Rash     Past Surgical History:   Procedure Laterality Date   • CHOLECYSTECTOMY     • TONSILLECTOMY AND ADENOIDECTOMY Bilateral       Family History   Problem Relation Age of Onset   • Hypertension Father    • Coronary artery disease Paternal Grandfather    • Coronary artery disease Paternal Grandmother    • Coronary artery disease Maternal Grandmother    • Coronary artery disease Maternal Grandfather      Social History     Socioeconomic History   • Marital status: Significant Other   • Number of children: 1   • Highest education level: High school graduate   Tobacco Use   • Smoking status: Every Day     Packs/day: 0.25     Years: 10.00     Pack years: 2.50     Types: Cigarettes   • Smokeless tobacco: Never   • Tobacco comments:     4-5 cig/day   Vaping Use   • Vaping Use: Never used   Substance and Sexual Activity   • Alcohol use: No   • Drug use: No   • Sexual activity: Yes     Partners: Male     Birth control/protection: None     Review of Systems  The following systems were reviewed and negative:  constitution, eyes, ENT, respiratory, cardiovascular, gastrointestinal, genitourinary, integument, breast, hematologic / lymphatic, musculoskeletal, neurological, behavioral/psych, endocrine and allergies / immunologic    Objective  Vitals:    12/23/22 1515 12/23/22 1530 12/23/22 1545 12/23/22 1600   BP: 101/63 106/55 96/67 108/74   BP Location:       Patient Position:       Pulse: 64 86 84 97   Resp:       Temp:       TempSrc:       SpO2:   100% 100%   Weight:       Height:         Physical Exam:  General Appearance:  Alert and cooperative, not in any acute distress.   Head:  Atraumatic and normocephalic, without obvious abnormality.   Eyes:          PERRLA, conjunctivae and sclerae normal, no Icterus. No pallor. Extraocular movements are within normal limits.   Ears:  Ears appear intact with no abnormalities noted.   Throat: No oral lesions, no thrush, oral mucosa moist.   Neck: Supple, trachea midline, no thyromegaly, no carotid bruit.   Back:   No kyphoscoliosis present. No tenderness to palpation,   range of motion normal.  No CVAT.    Lungs:   Chest shape is normal. Breath sounds heard bilaterally equally.  No crackles or wheezing. No Pleural rub or bronchial breathing. Normal respiratory effort.   Heart:  Normal S1 and S2, no murmur, no gallop, no rub. No JVD.   Abdomen:   Normal bowel sounds, no masses, no hepatomegaly, no splenomegaly. Soft, non-tender, no guarding, no rebound tenderness.  Gravid uterus.   Extremities: Moves all extremities well, no edema, no cyanosis, no clubbing.  Normal range of motion. Normal strength and tone.   Skin: No bleeding, bruising or rash. No palpable masses noted or induration.   Psychiatric: Alert and oriented  to time, place, and person. Appropriate mood and affect. Thought content organized and appropriate judgment.       FHT's: reassuring and category 1   Contractions: irregular   Cervix:  2 to 3 cm / 50% effacement/ballotable per nursing   Presentation: cephalic       Prenatal Labs  Lab Results   Component Value Date    HGB 13.3 12/23/2022    HEPBSAG Negative 05/24/2022    ABSCRN Negative 12/23/2022    FUX6RTZ1 Non Reactive 05/24/2022    HEPCVIRUSABY <0.1 05/24/2022    RGE7PGPG 135 07/21/2017    STREPGPB Positive (A) 12/05/2022    URINECX >100,000 CFU/mL Mixed Jolanta Isolated 10/20/2022       Current Labs Reviewed   I reviewed the patient's new clinical results.     Lab Results (last 24 hours)     Procedure Component Value Units Date/Time    COVID PRE-OP / PRE-PROCEDURE SCREENING ORDER (NO ISOLATION) - Swab, Nasal Cavity [[id number]]  (Normal) Collected: 12/23/22 1245    Specimen: Swab from Nasal Cavity Updated: 12/23/22 1358    Narrative:      The following orders were created for panel order COVID PRE-OP / PRE-PROCEDURE SCREENING ORDER (NO ISOLATION) - Swab, Nasal Cavity.  Procedure                               Abnormality         Status                     ---------                               -----------         ------                     COVID-19,[facility] IN-SET[facility]...[[id number]]  Normal               Final result                 Please view results for these tests on the individual orders.    COVID-19,[facility] IN-HOUSE,Nasal Swab No Transport Media 3-4 HR TAT - Swab, Nasal Cavity [[id number]]  (Normal) Collected: 12/23/22 1245    Specimen: Swab from Nasal Cavity Updated: 12/23/22 1358     COVID19 Not Detected    Narrative:      Fact sheet for providers: https://www.fda.gov/media/320191/download     Fact sheet for patients: https://www.fda.gov/media/655427/download    Test performed by PCR.    Consider negative results in combination with clinical observations, patient history, and epidemiological information.    CBC & Differential [[id number]]  (Abnormal) Collected: 12/23/22 1250    Specimen: Blood Updated: 12/23/22 1329    Narrative:      The following orders were created for panel order CBC & Differential.  Procedure                               Abnormality         Status                     ---------                               -----------         ------                     CBC Auto Differential[[id number]]        Abnormal            Final result                 Please view results for these tests on the individual orders.    CBC Auto Differential [[id number]]  (Abnormal) Collected: 12/23/22 1250    Specimen: Blood Updated: 12/23/22 1329     WBC 8.29 10*3/mm3      RBC 4.08 10*6/mm3      Hemoglobin 13.3 g/dL      Hematocrit 39.1 %      MCV 95.8 fL      MCH 32.6 pg      MCHC 34.0 g/dL      RDW 13.6 %      RDW-SD 48.6 fl      MPV 12.4 fL      Platelets 131 10*3/mm3      Neutrophil % 81.1 %      Lymphocyte % 10.7 %      Monocyte % 5.8 %      Eosinophil % 1.2 %      Basophil % 0.6 %      Immature Grans % 0.6 %      Neutrophils, Absolute 6.72 10*3/mm3      Lymphocytes, Absolute 0.89 10*3/mm3      Monocytes, Absolute 0.48 10*3/mm3      Eosinophils, Absolute 0.10 10*3/mm3      Basophils, Absolute 0.05 10*3/mm3      Immature Grans, Absolute 0.05 10*3/mm3      nRBC 0.0 /100 WBC              Assessment   1. IUP at  38w3d  2. Group B strep status: positive  3. Spontaneous rupture of membranes  4. Penicillin allergy  5. Anemia     Plan   1. Augment with pitocin  2. ABx for GBS prophylaxis   3. Anticipate  later today    Laly Damian M.D.  2022  17:27 EST

## 2022-12-23 NOTE — ANESTHESIA PROCEDURE NOTES
Labor Epidural      Patient reassessed immediately prior to procedure    Patient location during procedure: OB  Start Time: 12/23/2022 4:13 PM  Performed By  CRNA/CAA: Osmin Mercedes CRNA  Preanesthetic Checklist  Completed: patient identified, IV checked, site marked, risks and benefits discussed, surgical consent, monitors and equipment checked, pre-op evaluation and timeout performed  Additional Notes  Risks discussed , including but not limited to:  Headache, itching, n&v, infection, failure, decreased blood pressure, permanent chronic/back pain, nerve damage, paralysis, etc. All questions answered and informed consent obtained. Skin localized with lidocaine skin wheel. Test dose 2+3ml of 1.5% lidocaine with 1:200,000 epi.  Prep:  Pt Position:sitting  Sterile Tech:cap, gloves, mask and sterile barrier  Prep:chlorhexidine gluconate and isopropyl alcohol  Monitoring:blood pressure monitoring and continuous pulse oximetry  Epidural Block Procedure:  Approach:midline  Guidance:landmark technique and palpation technique  Location:L3-L4  Needle Type:Tuohy  Needle Gauge:18 G  Loss of Resistance Medium: air  Loss of Resistance: 6cm  Cath Depth at skin:14 cm  Paresthesia: none  Aspiration:negative  Test Dose:negative  Number of Attempts: 1  Post Assessment:  Dressing:occlusive dressing applied and secured with tape  Pt Tolerance:patient tolerated the procedure well with no apparent complications  Complications:no

## 2022-12-23 NOTE — ANESTHESIA PREPROCEDURE EVALUATION
Anesthesia Evaluation     Patient summary reviewed and Nursing notes reviewed   no history of anesthetic complications:  NPO Solid Status: > 8 hours  NPO Liquid Status: > 8 hours           Airway   Mallampati: I  TM distance: >3 FB  Neck ROM: full  no difficulty expected  Dental - normal exam     Pulmonary - normal exam   (+) a smoker Current,   Cardiovascular - negative cardio ROS and normal exam  Exercise tolerance: good (4-7 METS)    Rhythm: regular  Rate: normal        Neuro/Psych- negative ROS  GI/Hepatic/Renal/Endo    (+)  GERD well controlled,      Musculoskeletal (-) negative ROS    Abdominal  - normal exam    Abdomen: soft.  Bowel sounds: normal.   Substance History - negative use     OB/GYN    (+) Pregnant,         Other - negative ROS       ROS/Med Hx Other: plt - 131                    Anesthesia Plan    ASA 2     epidural     (Risks discussed , including but not limited to:  Headache, itching, n&v, infection, failure, decreased blood pressure, permanent chronic/back pain, nerve damage, paralysis, etc. All questions answered and informed consent obtained. )  intravenous induction     Anesthetic plan, risks, benefits, and alternatives have been provided, discussed and informed consent has been obtained with: patient.  Pre-procedure education provided

## 2022-12-24 VITALS
WEIGHT: 161.5 LBS | DIASTOLIC BLOOD PRESSURE: 58 MMHG | BODY MASS INDEX: 27.57 KG/M2 | SYSTOLIC BLOOD PRESSURE: 115 MMHG | TEMPERATURE: 98.3 F | HEIGHT: 64 IN | RESPIRATION RATE: 18 BRPM | OXYGEN SATURATION: 96 % | HEART RATE: 70 BPM

## 2022-12-24 PROBLEM — Z3A.38 38 WEEKS GESTATION OF PREGNANCY: Status: ACTIVE | Noted: 2022-12-23

## 2022-12-24 LAB
ABO GROUP BLD: NORMAL
BASOPHILS # BLD AUTO: 0.03 10*3/MM3 (ref 0–0.2)
BASOPHILS NFR BLD AUTO: 0.3 % (ref 0–1.5)
DEPRECATED RDW RBC AUTO: 45.5 FL (ref 37–54)
EOSINOPHIL # BLD AUTO: 0.14 10*3/MM3 (ref 0–0.4)
EOSINOPHIL NFR BLD AUTO: 1.6 % (ref 0.3–6.2)
ERYTHROCYTE [DISTWIDTH] IN BLOOD BY AUTOMATED COUNT: 13.3 % (ref 12.3–15.4)
FETAL BLEED: NEGATIVE
HCT VFR BLD AUTO: 31.4 % (ref 34–46.6)
HGB BLD-MCNC: 10.8 G/DL (ref 12–15.9)
IMM GRANULOCYTES # BLD AUTO: 0.03 10*3/MM3 (ref 0–0.05)
IMM GRANULOCYTES NFR BLD AUTO: 0.3 % (ref 0–0.5)
LYMPHOCYTES # BLD AUTO: 1.23 10*3/MM3 (ref 0.7–3.1)
LYMPHOCYTES NFR BLD AUTO: 14.2 % (ref 19.6–45.3)
MCH RBC QN AUTO: 32.3 PG (ref 26.6–33)
MCHC RBC AUTO-ENTMCNC: 34.4 G/DL (ref 31.5–35.7)
MCV RBC AUTO: 94 FL (ref 79–97)
MONOCYTES # BLD AUTO: 0.56 10*3/MM3 (ref 0.1–0.9)
MONOCYTES NFR BLD AUTO: 6.4 % (ref 5–12)
NEUTROPHILS NFR BLD AUTO: 6.7 10*3/MM3 (ref 1.7–7)
NEUTROPHILS NFR BLD AUTO: 77.2 % (ref 42.7–76)
NRBC BLD AUTO-RTO: 0 /100 WBC (ref 0–0.2)
NUMBER OF DOSES: NORMAL
PLATELET # BLD AUTO: 137 10*3/MM3 (ref 140–450)
PMV BLD AUTO: 12.2 FL (ref 6–12)
RBC # BLD AUTO: 3.34 10*6/MM3 (ref 3.77–5.28)
RH BLD: NEGATIVE
WBC NRBC COR # BLD: 8.69 10*3/MM3 (ref 3.4–10.8)

## 2022-12-24 PROCEDURE — 86901 BLOOD TYPING SEROLOGIC RH(D): CPT | Performed by: OBSTETRICS & GYNECOLOGY

## 2022-12-24 PROCEDURE — 85461 HEMOGLOBIN FETAL: CPT | Performed by: OBSTETRICS & GYNECOLOGY

## 2022-12-24 PROCEDURE — 25010000002 RHO D IMMUNE GLOBULIN 1500 UNIT/2ML SOLUTION PREFILLED SYRINGE: Performed by: OBSTETRICS & GYNECOLOGY

## 2022-12-24 PROCEDURE — 0503F POSTPARTUM CARE VISIT: CPT | Performed by: STUDENT IN AN ORGANIZED HEALTH CARE EDUCATION/TRAINING PROGRAM

## 2022-12-24 PROCEDURE — 86900 BLOOD TYPING SEROLOGIC ABO: CPT | Performed by: OBSTETRICS & GYNECOLOGY

## 2022-12-24 PROCEDURE — 85025 COMPLETE CBC W/AUTO DIFF WBC: CPT | Performed by: OBSTETRICS & GYNECOLOGY

## 2022-12-24 RX ORDER — ACETAMINOPHEN 500 MG
500 TABLET ORAL EVERY 6 HOURS PRN
Qty: 30 TABLET | Refills: 1 | Status: SHIPPED | OUTPATIENT
Start: 2022-12-24 | End: 2023-02-06

## 2022-12-24 RX ORDER — IBUPROFEN 800 MG/1
800 TABLET ORAL EVERY 6 HOURS PRN
Qty: 30 TABLET | Refills: 1 | Status: SHIPPED | OUTPATIENT
Start: 2022-12-24 | End: 2023-02-06

## 2022-12-24 RX ADMIN — PRENATAL VITAMINS-IRON FUMARATE 27 MG IRON-FOLIC ACID 0.8 MG TABLET 1 TABLET: at 08:19

## 2022-12-24 RX ADMIN — HUMAN RHO(D) IMMUNE GLOBULIN 1500 UNITS: 1500 SOLUTION INTRAMUSCULAR; INTRAVENOUS at 08:20

## 2022-12-24 NOTE — PLAN OF CARE
Goal Outcome Evaluation:  Plan of Care Reviewed With: patient        Progress: improving  Outcome Evaluation: VSS, I & O adequate, reports no pain. positive bonding obserrved.

## 2022-12-24 NOTE — PAYOR COMM NOTE
TO:[employer]  FROM:ROZ WYNNE, RN PHONE [phone]  INPT NOTIFICATION/CLINICALS    EDC 1/3/23@ 38 3/7 WEEKS DELIVERED /@1432  FEMALE 7#4OZ APG 8/    Summer Moore (22 y.o. Female)     Date of Birth   2000    Social Security Number       Address   [facility] [address] [place] [address]    Home Phone   [phone]    MRN   [id number]       Voodoo   None    Marital Status   Significant Other                            Admission Date   22    Admission Type   Elective    Admitting Provider   Nano Santos CNM    Attending Provider   Laly Damian MD    Department, Room/Bed   [facility], W2       Discharge Date       Discharge Disposition       Discharge Destination                               Attending Provider: Laly Damian MD    Allergies: Morphine And Related, Oxycodone, Amoxicillin, Dermoplast [Benzocaine-menthol]    Isolation: None   Infection: None   Code Status: CPR    Ht: 162.6 cm (64\")   Wt: 73.3 kg (161 lb 8 oz)    Admission Cmt: None   Principal Problem: None                Active Insurance as of 2022     Primary Coverage     Payor Plan Insurance Group Employer/Plan Group    [employer] [place] [employer]      Payor Plan Address Payor Plan Phone Number Payor Plan Fax Number Effective Dates    [address] [address] [phone]  2017 - None Entered    [facility] [id number]       Subscriber Name Subscriber Birth Date Member ID       SUMMER MOORE 2000 [id number]                 Emergency Contacts      (Rel.) Home Phone Work Phone Mobile Phone    Philly Caal (Mother) [phone] -- --               History & Physical      Laly Damian MD at [address] [place] [address] (Late Entry)  Summer Moore  : 2000  MRN: [id number]  CSN: [id number]    History and Physical  Summer Moore is a 22 y.o. year old  with an Estimated Date of Delivery: 1/3/23 currently at 38w3d presenting with  spontaneous rupture of membranes at 8 AM.  Patient presented to labor and delivery.  She was not having any significant contractions.  Patient had been seen on labor and delivery last night.  Patient's GBS is positive.  She does have known penicillin allergy.    Prenatal care has been with [facility].  It has been benign other than iron deficiency anemia.    History  OB History    Para Term  AB Living   2 1 1 0 0 1   SAB IAB Ectopic Molar Multiple Live Births   0 0 0 0 0 1      # Outcome Date GA Lbr Nomi/2nd Weight Sex Delivery Anes PTL Lv   2 Current            1 Term 10/12/17 39w0d / 01:23 3175 g (7 lb) F Vag-Spont EPI N GURPREET      Name: KITTY HAJI      Apgar1: 9  Apgar5: 10     History reviewed. No pertinent past medical history.  No current facility-administered medications on file prior to encounter.     Current Outpatient Medications on File Prior to Encounter   Medication Sig Dispense Refill   • famotidine (Pepcid) 20 MG tablet Take 1 tablet by mouth 2 (Two) Times a Day As Needed for Heartburn. 60 tablet 4   • ferrous sulfate 324 (65 Fe) MG tablet delayed-release EC tablet Take 1 tablet by mouth 2 (Two) Times a Day With Meals. 60 tablet 12   • metoclopramide (Reglan) 10 MG tablet Take 1 tablet by mouth 4 (Four) Times a Day Before Meals & at Bedtime. 120 tablet 2   • Prenatal Vit-Fe Fumarate-FA (PRENATAL VITAMIN PO) Take 1 tablet by mouth Daily.       Allergies   Allergen Reactions   • Morphine And Related Other (See Comments)     Pt states caused \"burning all over\"     • Oxycodone Nausea And Vomiting   • Amoxicillin Rash     Past Surgical History:   Procedure Laterality Date   • CHOLECYSTECTOMY     • TONSILLECTOMY AND ADENOIDECTOMY Bilateral      Family History   Problem Relation Age of Onset   • Hypertension Father    • Coronary artery disease Paternal Grandfather    • Coronary artery disease Paternal Grandmother    • Coronary artery disease Maternal Grandmother    • Coronary  artery disease Maternal Grandfather      Social History     Socioeconomic History   • Marital status: Significant Other   • Number of children: 1   • Highest education level: High school graduate   Tobacco Use   • Smoking status: Every Day     Packs/day: 0.25     Years: 10.00     Pack years: 2.50     Types: Cigarettes   • Smokeless tobacco: Never   • Tobacco comments:     4-5 cig/day   Vaping Use   • Vaping Use: Never used   Substance and Sexual Activity   • Alcohol use: No   • Drug use: No   • Sexual activity: Yes     Partners: Male     Birth control/protection: None     Review of Systems  The following systems were reviewed and negative:  constitution, eyes, ENT, respiratory, cardiovascular, gastrointestinal, genitourinary, integument, breast, hematologic / lymphatic, musculoskeletal, neurological, behavioral/psych, endocrine and allergies / immunologic    Objective  Vitals:    12/23/22 1515 12/23/22 1530 12/23/22 1545 12/23/22 1600   BP: 101/63 106/55 96/67 108/74   BP Location:       Patient Position:       Pulse: 64 86 84 97   Resp:       Temp:       TempSrc:       SpO2:   100% 100%   Weight:       Height:         Physical Exam:  General Appearance:  Alert and cooperative, not in any acute distress.   Head:  Atraumatic and normocephalic, without obvious abnormality.   Eyes:          PERRLA, conjunctivae and sclerae normal, no Icterus. No pallor. Extraocular movements are within normal limits.   Ears:  Ears appear intact with no abnormalities noted.   Throat: No oral lesions, no thrush, oral mucosa moist.   Neck: Supple, trachea midline, no thyromegaly, no carotid bruit.   Back:   No kyphoscoliosis present. No tenderness to palpation,   range of motion normal.  No CVAT.   Lungs:   Chest shape is normal. Breath sounds heard bilaterally equally.  No crackles or wheezing. No Pleural rub or bronchial breathing. Normal respiratory effort.   Heart:  Normal S1 and S2, no murmur, no gallop, no rub. No JVD.   Abdomen:    Normal bowel sounds, no masses, no hepatomegaly, no splenomegaly. Soft, non-tender, no guarding, no rebound tenderness.  Gravid uterus.   Extremities: Moves all extremities well, no edema, no cyanosis, no clubbing.  Normal range of motion. Normal strength and tone.   Skin: No bleeding, bruising or rash. No palpable masses noted or induration.   Psychiatric: Alert and oriented  to time, place, and person. Appropriate mood and affect. Thought content organized and appropriate judgment.       FHT's: reassuring and category 1   Contractions: irregular   Cervix:  2 to 3 cm / 50% effacement/ballotable per nursing   Presentation: cephalic       Prenatal Labs  Lab Results   Component Value Date    HGB 13.3 12/23/2022    HEPBSAG Negative 05/24/2022    ABSCRN Negative 12/23/2022    HZY4BJL7 Non Reactive 05/24/2022    HEPCVIRUSABY <0.1 05/24/2022    VCQ6OSOC 135 07/21/2017    STREPGPB Positive (A) 12/05/2022    URINECX >100,000 CFU/mL Mixed Jolanta Isolated 10/20/2022       Current Labs Reviewed   I reviewed the patient's new clinical results.     Lab Results (last 24 hours)     Procedure Component Value Units Date/Time    COVID PRE-OP / PRE-PROCEDURE SCREENING ORDER (NO ISOLATION) - Swab, Nasal Cavity [[id number]]  (Normal) Collected: 12/23/22 1245    Specimen: Swab from Nasal Cavity Updated: 12/23/22 1357    Narrative:      The following orders were created for panel order COVID PRE-OP / PRE-PROCEDURE SCREENING ORDER (NO ISOLATION) - Swab, Nasal Cavity.  Procedure                               Abnormality         Status                     ---------                               -----------         ------                     COVID-19,[facility] IN-SET[facility]...[[id number]]  Normal              Final result                 Please view results for these tests on the individual orders.    COVID-19,[facility] IN-HOUSE,Nasal Swab No Transport Media 3-4 HR TAT - Swab, Nasal Cavity [[id number]]  (Normal) Collected: 12/23/22 1245    Specimen: Swab  from Nasal Cavity Updated: 22 1358     COVID19 Not Detected    Narrative:      Fact sheet for providers: https://www.fda.gov/media/932483/download     Fact sheet for patients: https://www.fda.gov/media/525964/download    Test performed by PCR.    Consider negative results in combination with clinical observations, patient history, and epidemiological information.    CBC & Differential [[id number]]  (Abnormal) Collected: 22 1250    Specimen: Blood Updated: 22 1329    Narrative:      The following orders were created for panel order CBC & Differential.  Procedure                               Abnormality         Status                     ---------                               -----------         ------                     CBC Auto Differential[[id number]]        Abnormal            Final result                 Please view results for these tests on the individual orders.    CBC Auto Differential [[id number]]  (Abnormal) Collected: 22 1250    Specimen: Blood Updated: 22 1329     WBC 8.29 10*3/mm3      RBC 4.08 10*6/mm3      Hemoglobin 13.3 g/dL      Hematocrit 39.1 %      MCV 95.8 fL      MCH 32.6 pg      MCHC 34.0 g/dL      RDW 13.6 %      RDW-SD 48.6 fl      MPV 12.4 fL      Platelets 131 10*3/mm3      Neutrophil % 81.1 %      Lymphocyte % 10.7 %      Monocyte % 5.8 %      Eosinophil % 1.2 %      Basophil % 0.6 %      Immature Grans % 0.6 %      Neutrophils, Absolute 6.72 10*3/mm3      Lymphocytes, Absolute 0.89 10*3/mm3      Monocytes, Absolute 0.48 10*3/mm3      Eosinophils, Absolute 0.10 10*3/mm3      Basophils, Absolute 0.05 10*3/mm3      Immature Grans, Absolute 0.05 10*3/mm3      nRBC 0.0 /100 WBC             Assessment   1. IUP at 38w3d  2. Group B strep status: positive  3. Spontaneous rupture of membranes  4. Penicillin allergy  5. Anemia    Plan   1. Augment with pitocin  2. ABx for GBS prophylaxis   3. Anticipate  later today    Laly Damian M.D.  2022  17:27  EST      Electronically signed by Laly Damian MD at 12/23/22 1729          Operative/Procedure Notes (last 24 hours)      Laly Damian MD at 12/23/22 1731          [facility]  Vaginal Delivery Note    Delivery details     Delivery: Vaginal, Spontaneous     YOB: 2024    Time of Birth: 4:32 PM      Anesthesia:  Epidural   Delivering clinician:  Laly Damian M.D.   Forceps?   No   Vacuum? No    Shoulder dystocia present: No      Delivery narrative:  The patient progressed to complete and pushing.  With good maternal effort she delivered a viable female infant.  The head presented in the NELI presentation.  There was no nuchal cord present.  The anterior fetal shoulder was then easily delivered with a gentle downward motion followed by the posterior fetal shoulder, followed by the remainder of the infant without difficulty.  The infant was immediately vigorous.  The oropharynx and nares were bulb-suction.  The infant was placed on the maternal abdomen and the cord was clamped roughly 45 seconds following delivery.  Cord blood was then collected.  The placenta then delivered spontaneously intact, and a three vessel cord was noted.  Uterine massage and Pitocin 20 units IV was given until the fundus was firm.  The cervix, vagina, perineum, and rectum were carefully inspected for lacerations.  There was a first-degree midline perineal abrasion.  No suture was required.   Counts for needles, sponges, laps and instruments were correct at the end of the delivery.  There were no major complications.  Mother and baby were stable at the end of the delivery.    Infant details    Findings: female  infant     Infant observations: Weight: 3307 g (7 lb 4.7 oz)   Length: 20  in   Apgars: 8  @ 1 minute /    9  @ 5 minutes     Placenta, Cord, and Fluid    Placenta delivered  Spontaneous  at   12/23/2022  4:39 PM         Nuchal Cord?  no   Cord blood obtained: Yes      Repair    Episiotomy: None    Lacerations: Yes   Laceration Information  Laceration Repaired?   Perineal:  Superficial abrasion   Periurethral:       Labial:       Sulcus:       Vaginal:       Cervical:            Estimated Blood Loss:  500 ml       Complications  none    Disposition  Mother to Remain in LD  in stable condition currently.  Baby to remains with mom  in stable condition currently.      Laly Damian MD  12/23/22  17:31 EST    Electronically signed by Laly Damian MD at 12/23/22 7392       Physician Progress Notes (last 24 hours)  Notes from 12/23/22 0659 through 12/24/22 0659   No notes of this type exist for this encounter.

## 2022-12-24 NOTE — PROGRESS NOTES
Luiz Elmore  : 2000  MRN: [id number]  CSN: [id number]    Postpartum Day #1  Subjective   Her pain is well controlled. Vaginal bleeding is about the same as a normal period. She is tolerating oral intake. She is ambulatory. She is voiding spontaneously. She is bottle feeding currently, reports she initially attempted breastfeeding but due to difficulty with latch has transitioned to bottle at this time.     Objective     Min/max vitals past 24 hours:   Temp  Min: 97.6 °F (36.4 °C)  Max: 98.3 °F (36.8 °C)  BP  Min: 96/67  Max: 134/89  Pulse  Min: 63  Max: 118  Resp  Min: 17  Max: 18        General: Well developed, well nourished and in no acute distress   Abdomen: Soft, non-tender, no masses and fundus firm and non-tender   Pelvic: Not performed   Ext: Non-tender, not edematous     Lab Results   Component Value Date    WBC 8.69 2022    HGB 10.8 (L) 2022    HCT 31.4 (L) 2022    MCV 94.0 2022     (L) 2022    RUBELLAABIGG 1.44 2022    HEPBSAG Negative 2022        Assessment & Plan    Postpartum Day #1 S/P spontaneous vaginal delivery  1. Continue routine postpartum care   2. Discussed option for breastfeeding support if she would like to try again  3. Iron ordered for mild postpartum anemia  4. Interested in BTL for contraception - FF not signed 30+ days prior to PRIYA, thus patient is aware it will be a 6 wk interval procedure  5. Appropriate from an OB standpoint for discharge, however she was GBS+ and did not receive 4 hours of abx prior to delivery --> awaiting peds clearance for dc.    Jana Corona MD  2022  09:26 EST

## 2022-12-25 NOTE — DISCHARGE SUMMARY
OBSTETRICS DISCHARGE SUMMARY    Admission Date: 2022    Discharge Date: 2022     Discharge Diagnosis:   PPD#1 s/p  at 38w3d  GBS (+) status  Acute blood loss anemia    Procedures/Delivery information:  Date of Delivery: 22    Consults:  None    Pertinent Labs/Immaging:  Postpartum Hgb - 10.8    Hospital Summary  Summer Elmore is a 22 y.o.  who was admitted on 22 for rupture of membranes. She was started on pitocin for augmentation of labor. She underwent delivery without complication. Please see the delivery note for additional details.    During the postpartum period, the patient met all postpartum milestones including ambulating without difficulty, voiding spontaneously, passage of flatus and adequate intake and output. Her pain was well-controlled with PO medicines. The patient's lochia was appropriate. The patient was bottle feeding. The patient was discharged home on PPD#1 in good condition with directions to follow-up in 6 weeks with our office. The patient is interested in interval laparoscopic BTL for contraception.    Discharge Medications:     Discharge Medications      New Medications      Instructions Start Date   acetaminophen 500 MG tablet  Commonly known as: TYLENOL   500 mg, Oral, Every 6 Hours PRN      ibuprofen 800 MG tablet  Commonly known as: ADVIL,MOTRIN   800 mg, Oral, Every 6 Hours PRN         Continue These Medications      Instructions Start Date   famotidine 20 MG tablet  Commonly known as: Pepcid   20 mg, Oral, 2 Times Daily PRN      ferrous sulfate 324 (65 Fe) MG tablet delayed-release EC tablet   324 mg, Oral, 2 Times Daily With Meals      PRENATAL VITAMIN PO   1 tablet, Oral, Daily         Stop These Medications    metoclopramide 10 MG tablet  Commonly known as: Reglan          Physical Exam on D/C - see daily progress note from same date    Follow up:  6 weeks in our office    Time spent on discharge: 10 minutes    Jana Corona  MD  Obstetrics and Gynecology  [facility]

## 2023-02-06 ENCOUNTER — POSTPARTUM VISIT (OUTPATIENT)
Dept: OBSTETRICS AND GYNECOLOGY | Facility: CLINIC | Age: 23
End: 2023-02-06
Payer: COMMERCIAL

## 2023-02-06 ENCOUNTER — TELEPHONE (OUTPATIENT)
Dept: OBSTETRICS AND GYNECOLOGY | Facility: CLINIC | Age: 23
End: 2023-02-06

## 2023-02-06 VITALS
BODY MASS INDEX: 24.41 KG/M2 | HEIGHT: 64 IN | SYSTOLIC BLOOD PRESSURE: 110 MMHG | WEIGHT: 143 LBS | DIASTOLIC BLOOD PRESSURE: 70 MMHG

## 2023-02-06 DIAGNOSIS — Z30.011 ENCOUNTER FOR INITIAL PRESCRIPTION OF CONTRACEPTIVE PILLS: Primary | ICD-10-CM

## 2023-02-06 PROBLEM — O26.893 RH NEGATIVE STATUS DURING PREGNANCY IN THIRD TRIMESTER: Status: RESOLVED | Noted: 2022-12-16 | Resolved: 2023-02-06

## 2023-02-06 PROBLEM — O99.820 GBS (GROUP B STREPTOCOCCUS CARRIER), +RV CULTURE, CURRENTLY PREGNANT: Status: RESOLVED | Noted: 2022-12-16 | Resolved: 2023-02-06

## 2023-02-06 PROBLEM — Z3A.38 38 WEEKS GESTATION OF PREGNANCY: Status: RESOLVED | Noted: 2022-12-23 | Resolved: 2023-02-06

## 2023-02-06 PROBLEM — Z67.91 RH NEGATIVE STATUS DURING PREGNANCY IN THIRD TRIMESTER: Status: RESOLVED | Noted: 2022-12-16 | Resolved: 2023-02-06

## 2023-02-06 PROCEDURE — 0503F POSTPARTUM CARE VISIT: CPT | Performed by: MIDWIFE

## 2023-02-06 RX ORDER — NORETHINDRONE ACETATE AND ETHINYL ESTRADIOL 1; .02 MG/1; MG/1
1 TABLET ORAL DAILY
Qty: 28 TABLET | Refills: 12 | Status: SHIPPED | OUTPATIENT
Start: 2023-02-06 | End: 2024-02-05

## 2023-02-06 NOTE — PROGRESS NOTES
"History  Chief Complaint   Patient presents with   • Postpartum Care     No Complaints/concerns         Summer Elmore is a 23 y.o. year old  presenting to be seen for her postpartum visit.  She had a vaginal delivery.    Since delivery she has not been sexually active.   She does not have concerns about post-partum blues/depression.   She is bottle feeding. She breast fed x 3 weeks and then milk dried up  For ongoing contraception, her plans are OCP's (estrogen/progesterone).  She has not had a menstrual cycle.         Physical  /70   Ht 162.6 cm (64\")   Wt 64.9 kg (143 lb)   Breastfeeding No   BMI 24.55 kg/m²     General:  well developed; well nourished  no acute distress   Abdomen: soft, non-tender; no masses   Pelvis: External genitalia:  normal appearance of the external genitalia including Bartholin's and Storla's glands.  Uterus:  normal size, shape and consistency.  Adnexa:  normal bimanual exam of the adnexa.        Assessment   1. Normal 6 week postpartum exam  2. Contraceptive management     Plan   1. BC options reviewed and compared today: OCP (estrogen/progesterone) She was instructed how to start her birth control.  It should be started on  following the onset of her next cycle.  The patient was educated regarding the correct and consistent use.  Because it may take 1 month to become effective, the use of alternative contraception for one month was stressed.  The potential for breakthrough bleeding for up to 4 cycles was also emphasized.  In addition, the patient was counseled regarding hormonal contraception not providing protecting against sexually transmitted infections and the need for safe sex practices.  2. Pap smear not due  3. Follow up 1 year    New Medications Ordered This Visit   Medications   • norethindrone-ethinyl estradiol (MICROGESTIN ) 1-20 MG-MCG per tablet     Sig: Take 1 tablet by mouth Daily for 364 days.     Dispense:  28 tablet     Refill:  12          This " note was electronically signed.  Nano Santos, APRN  2/6/2023

## 2023-02-06 NOTE — TELEPHONE ENCOUNTER
Caller: Summer Elmore    Relationship to patient: Self    Best call back number: [phone]    PT NEEDS A WORK NOTE FOR TODAY(SHE HAD AN APPT TODAY).    PLEASE FAX IT TO  [facility]  FAX#[phone]    THANK YOU.

## 2024-02-07 DIAGNOSIS — O36.80X0 PREGNANCY WITH UNCERTAIN FETAL VIABILITY, SINGLE OR UNSPECIFIED FETUS: Primary | ICD-10-CM

## 2024-02-07 DIAGNOSIS — N91.2 AMENORRHEA: Primary | ICD-10-CM

## 2024-02-27 ENCOUNTER — INITIAL PRENATAL (OUTPATIENT)
Dept: OBSTETRICS AND GYNECOLOGY | Facility: CLINIC | Age: 24
End: 2024-02-27
Payer: COMMERCIAL

## 2024-02-27 VITALS — WEIGHT: 143 LBS | DIASTOLIC BLOOD PRESSURE: 60 MMHG | BODY MASS INDEX: 24.55 KG/M2 | SYSTOLIC BLOOD PRESSURE: 104 MMHG

## 2024-02-27 DIAGNOSIS — Z12.4 SCREENING FOR MALIGNANT NEOPLASM OF CERVIX: ICD-10-CM

## 2024-02-27 DIAGNOSIS — Z34.91 PRENATAL CARE IN FIRST TRIMESTER: Primary | ICD-10-CM

## 2024-02-27 RX ORDER — PRENATAL VIT NO.126/IRON/FOLIC 28MG-0.8MG
TABLET ORAL DAILY
COMMUNITY

## 2024-02-28 LAB
ABO GROUP BLD: NORMAL
AMPHETAMINES UR QL SCN: NEGATIVE NG/ML
BARBITURATES UR QL SCN: NEGATIVE NG/ML
BASOPHILS # BLD AUTO: 0 X10E3/UL (ref 0–0.2)
BASOPHILS NFR BLD AUTO: 0 %
BENZODIAZ UR QL SCN: NEGATIVE NG/ML
BLD GP AB SCN SERPL QL: NEGATIVE
BZE UR QL SCN: NEGATIVE NG/ML
CANNABINOIDS UR QL SCN: NEGATIVE NG/ML
CREAT UR-MCNC: 38.2 MG/DL (ref 20–300)
EOSINOPHIL # BLD AUTO: 0.1 X10E3/UL (ref 0–0.4)
EOSINOPHIL NFR BLD AUTO: 2 %
ERYTHROCYTE [DISTWIDTH] IN BLOOD BY AUTOMATED COUNT: 11.8 % (ref 11.7–15.4)
HBV SURFACE AG SERPL QL IA: NEGATIVE
HCT VFR BLD AUTO: 38 % (ref 34–46.6)
HCV IGG SERPL QL IA: NON REACTIVE
HGB BLD-MCNC: 12.8 G/DL (ref 11.1–15.9)
HIV 1+2 AB+HIV1 P24 AG SERPL QL IA: NON REACTIVE
IMM GRANULOCYTES # BLD AUTO: 0 X10E3/UL (ref 0–0.1)
IMM GRANULOCYTES NFR BLD AUTO: 1 %
LABORATORY COMMENT REPORT: NORMAL
LYMPHOCYTES # BLD AUTO: 1.1 X10E3/UL (ref 0.7–3.1)
LYMPHOCYTES NFR BLD AUTO: 21 %
MCH RBC QN AUTO: 31.4 PG (ref 26.6–33)
MCHC RBC AUTO-ENTMCNC: 33.7 G/DL (ref 31.5–35.7)
MCV RBC AUTO: 93 FL (ref 79–97)
METHADONE UR QL SCN: NEGATIVE NG/ML
MONOCYTES # BLD AUTO: 0.4 X10E3/UL (ref 0.1–0.9)
MONOCYTES NFR BLD AUTO: 6 %
NEUTROPHILS # BLD AUTO: 3.8 X10E3/UL (ref 1.4–7)
NEUTROPHILS NFR BLD AUTO: 70 %
OPIATES UR QL SCN: NEGATIVE NG/ML
OXYCODONE+OXYMORPHONE UR QL SCN: NEGATIVE NG/ML
PCP UR QL: NEGATIVE NG/ML
PH UR: 6.7 [PH] (ref 4.5–8.9)
PLATELET # BLD AUTO: 239 X10E3/UL (ref 150–450)
PROPOXYPH UR QL SCN: NEGATIVE NG/ML
RBC # BLD AUTO: 4.08 X10E6/UL (ref 3.77–5.28)
RH BLD: NEGATIVE
RPR SER QL: NON REACTIVE
RUBV IGG SERPL IA-ACNC: 1.3 INDEX
WBC # BLD AUTO: 5.5 X10E3/UL (ref 3.4–10.8)

## 2024-03-04 LAB — REF LAB TEST METHOD: NORMAL

## 2024-03-06 NOTE — PROGRESS NOTES
New Pregnancy Visit    Subjective   Chief Complaint   Patient presents with    Initial Prenatal Visit     LMP 23, TVS done today 7w4d, last pap 22 WNL absent t-zone. No complaints       Summer Elmore is a 24 y.o. year old .  Patient's last menstrual period was 2023.  She presents to initiate prenatal care with our group today.     No major complaints.  2 previous vaginal births at term.  Pelvis proven to 7 pounds 4 ounces.  Most recent delivery was 2022.    Social History    Tobacco Use      Smoking status: Every Day        Packs/day: 0.25        Years: 0.3 packs/day for 10.0 years (2.5 ttl pk-yrs)        Types: Cigarettes      Smokeless tobacco: Never      Tobacco comments: 4-5 cig/day      Current Outpatient Medications on File Prior to Visit   Medication Sig Dispense Refill    prenatal vitamin (prenatal, CLASSIC, vitamin) tablet Take  by mouth Daily.       No current facility-administered medications on file prior to visit.          Objective   /60   Wt 64.9 kg (143 lb)   LMP 2023   BMI 24.55 kg/m²   Physical Exam:  Normal, gestational age-appropriate exam today        Medical Decision Making:    Lab Review:   Pap smear     Note Review:  Delivery note     Imaging Review:  Pelvic ultrasound report  IUP measuring 7+4 weeks with appropriate fetal cardiac activity. PRIYA is set at 10/11/2024. The cervix and right ovary are normal in appearance. There is a 3.7 cm simple cyst noted on the left ovary. Trace amount of free fluid in the cul-de-sac.   Assessment   IUP at 7+4 weeks  Supervision of low risk pregnancy  Screening for malignancy the cervix     Plan    The problem list for pregnancy was initiated today  Tests/Orders/Rx for today:  Orders Placed This Encounter   Procedures    OB Panel With HIV     Order Specific Question:   Release to patient     Answer:   Routine Release [[id number]]    Urine Drug Screen - Urine, Clean Catch     Order Specific Question:    Release to patient     Answer:   Routine Release [[id number]]       Medication Management: None    Testing for GC / Chlamydia / trichomonas was done today  Genetic testing reviewed: she will consider the information and make a decision at a later date.  Information reviewed: exercise in pregnancy, nutrition in pregnancy, weight gain in pregnancy, work and travel restrictions during pregnancy, list of OTC medications acceptable in pregnancy, and call coverage groups    Follow up: 4 week(s)    Elmre Gaitan MD  Obstetrics and Gynecology  [facility]

## 2024-03-26 ENCOUNTER — ROUTINE PRENATAL (OUTPATIENT)
Dept: OBSTETRICS AND GYNECOLOGY | Facility: CLINIC | Age: 24
End: 2024-03-26
Payer: COMMERCIAL

## 2024-03-26 VITALS — WEIGHT: 150 LBS | BODY MASS INDEX: 25.75 KG/M2 | DIASTOLIC BLOOD PRESSURE: 60 MMHG | SYSTOLIC BLOOD PRESSURE: 120 MMHG

## 2024-03-26 DIAGNOSIS — Z30.2 REQUEST FOR STERILIZATION: ICD-10-CM

## 2024-03-26 DIAGNOSIS — Z67.91 RH NEGATIVE STATE IN ANTEPARTUM PERIOD, FIRST TRIMESTER: ICD-10-CM

## 2024-03-26 DIAGNOSIS — O26.891 RH NEGATIVE STATE IN ANTEPARTUM PERIOD, FIRST TRIMESTER: ICD-10-CM

## 2024-03-26 DIAGNOSIS — Z34.81 ENCOUNTER FOR SUPERVISION OF OTHER NORMAL PREGNANCY IN FIRST TRIMESTER: Primary | ICD-10-CM

## 2024-03-26 PROCEDURE — 99214 OFFICE O/P EST MOD 30 MIN: CPT | Performed by: STUDENT IN AN ORGANIZED HEALTH CARE EDUCATION/TRAINING PROGRAM

## 2024-03-26 PROCEDURE — 96372 THER/PROPH/DIAG INJ SC/IM: CPT | Performed by: STUDENT IN AN ORGANIZED HEALTH CARE EDUCATION/TRAINING PROGRAM

## 2024-03-26 NOTE — PROGRESS NOTES
Prenatal Care Visit    Subjective   Chief Complaint   Patient presents with    Routine Prenatal Visit     No complaints     History:   Summer is a  currently at 11w3d who presents for a prenatal care visit today.    Denies N/V, GERD. Reports a small amount of spotting over the weekend after intercourse but no further bleeding. She also had UTI testing that was normal.      Objective   /60   Wt 68 kg (150 lb)   LMP 2023   BMI 25.75 kg/m²   Physical Exam:  Normal, gestational age-appropriate exam today      Assessment & Plan     IUP @ 11w3d  Routine care: I have reviewed the prenatal labs and ultrasound(s) today. I have reviewed the most recent prenatal progress note(s).   Rh (-): will give Rhogam today due to spotting. Plan Rhogam at 28 wks.  Request for sterilization: will need to sign FF in 2T     Diagnosis Plan   1. Encounter for supervision of other normal pregnancy in first trimester  [id number] PLUS Core+SCA - Blood,      2. Rh negative state in antepartum period, first trimester  Rhogam Immune Globulin Immunization      3. Request for sterilization           Medication Management: continue PNV. Rhogam today.    Topics discussed: Prenatal care milestones  SAB precautions  Genetic screening - cfDNA ordered today   Tests next visit: none   Next visit: 4 week(s)     Jana Corona MD  Obstetrics and Gynecology  [facility]

## 2024-04-24 ENCOUNTER — ROUTINE PRENATAL (OUTPATIENT)
Dept: OBSTETRICS AND GYNECOLOGY | Facility: CLINIC | Age: 24
End: 2024-04-24
Payer: COMMERCIAL

## 2024-04-24 VITALS — SYSTOLIC BLOOD PRESSURE: 110 MMHG | BODY MASS INDEX: 25.47 KG/M2 | DIASTOLIC BLOOD PRESSURE: 60 MMHG | WEIGHT: 148.4 LBS

## 2024-04-24 DIAGNOSIS — Z34.92 SECOND TRIMESTER PREGNANCY: Primary | ICD-10-CM

## 2024-04-24 PROCEDURE — 99213 OFFICE O/P EST LOW 20 MIN: CPT | Performed by: OBSTETRICS & GYNECOLOGY

## 2024-04-24 NOTE — PROGRESS NOTES
Chief Complaint   Patient presents with    Routine Prenatal Visit     Prenatal visit with no problems or concerns        HPI:   , 15w5d gestation reports doing well    ROS:  See Prenatal Episode/Flowsheet  /60   Wt 67.3 kg (148 lb 6.4 oz)   LMP 2023   BMI 25.47 kg/m²      EXAM:  EXTREMITIES:  No swelling-See Prenatal Episode/Flowsheet    ABDOMEN:  FHTs/Movement noted-See Prenatal Episode/Flowsheet    URINE GLUCOSE/PROTEIN:  See Prenatal Episode/Flowsheet    PELVIC EXAM:  See Prenatal Episode/Flowsheet  CV:  Lungs:  GYN:    MDM:    Lab Results   Component Value Date    HGB 12.8 2024    RUBELLAABIGG 1.30 2024    HEPBSAG Negative 2024    ABO AB 2024    RH Negative 2024    ABSCRN Negative 2024    LOC3WKA3 Non Reactive 2024    HEPCVIRUSABY Non Reactive 2024    [id number] 135 2017    STREPGPB Positive (A) 2022    URINECX >100,000 CFU/mL Mixed Jolanta Isolated 10/20/2022       U/S:US Ob Transvaginal (2024 10:32)     1. IUP 15w5d  2. Routine care   3. Simple left 3.7cm ovarian cyst  4. Anatomy 3-4 weeks

## 2024-05-16 ENCOUNTER — ROUTINE PRENATAL (OUTPATIENT)
Dept: OBSTETRICS AND GYNECOLOGY | Facility: CLINIC | Age: 24
End: 2024-05-16
Payer: COMMERCIAL

## 2024-05-16 VITALS — SYSTOLIC BLOOD PRESSURE: 120 MMHG | WEIGHT: 151.4 LBS | DIASTOLIC BLOOD PRESSURE: 60 MMHG | BODY MASS INDEX: 25.99 KG/M2

## 2024-05-16 DIAGNOSIS — Z34.92 SECOND TRIMESTER PREGNANCY: Primary | ICD-10-CM

## 2024-05-16 NOTE — PROGRESS NOTES
Chief Complaint   Patient presents with    Routine Prenatal Visit     Prenatal visit with Anatomy scan done today. Complains of hips burning and tingling when she lays down at night.         HPI:   , 18w6d gestation reports doing well    ROS:  See Prenatal Episode/Flowsheet  /60   Wt 68.7 kg (151 lb 6.4 oz)   LMP 2023   BMI 25.99 kg/m²      EXAM:  EXTREMITIES:  No swelling-See Prenatal Episode/Flowsheet    ABDOMEN:  FHTs/Movement noted-See Prenatal Episode/Flowsheet    URINE GLUCOSE/PROTEIN:  See Prenatal Episode/Flowsheet    PELVIC EXAM:  See Prenatal Episode/Flowsheet  CV:  Lungs:  GYN:    MDM:    Lab Results   Component Value Date    HGB 12.8 2024    RUBELLAABIGG 1.30 2024    HEPBSAG Negative 2024    ABO AB 2024    RH Negative 2024    ABSCRN Negative 2024    PVG1HBO8 Non Reactive 2024    HEPCVIRUSABY Non Reactive 2024    [id number] 135 2017    STREPGPB Positive (A) 2022    URINECX >100,000 CFU/mL Mixed Jolanta Isolated 10/20/2022       U/S: Anatomic survey is within normal limits.  Size is consistent with dates.  Vertex.  Posterior placenta.  Three-vessel cord.  Active male fetus    1. IUP 18w6d  2. Routine care   3.  Normal anatomy  4. btl

## 2024-06-13 ENCOUNTER — ROUTINE PRENATAL (OUTPATIENT)
Dept: OBSTETRICS AND GYNECOLOGY | Facility: CLINIC | Age: 24
End: 2024-06-13
Payer: COMMERCIAL

## 2024-06-13 VITALS — WEIGHT: 153 LBS | BODY MASS INDEX: 26.26 KG/M2 | DIASTOLIC BLOOD PRESSURE: 60 MMHG | SYSTOLIC BLOOD PRESSURE: 104 MMHG

## 2024-06-13 DIAGNOSIS — Z34.92 SECOND TRIMESTER PREGNANCY: Primary | ICD-10-CM

## 2024-06-13 NOTE — PROGRESS NOTES
Chief Complaint   Patient presents with    Routine Prenatal Visit     Prenatal visit with no problems or concerns        HPI:   , 22w6d gestation reports doing well    ROS:  See Prenatal Episode/Flowsheet  /60   Wt 69.4 kg (153 lb)   LMP 2023   BMI 26.26 kg/m²      EXAM:  EXTREMITIES:  No swelling-See Prenatal Episode/Flowsheet    ABDOMEN:  FHTs/Movement noted-See Prenatal Episode/Flowsheet    URINE GLUCOSE/PROTEIN:  See Prenatal Episode/Flowsheet    PELVIC EXAM:  See Prenatal Episode/Flowsheet  CV:  Lungs:  GYN:    MDM:    Lab Results   Component Value Date    HGB 12.8 2024    RUBELLAABIGG 1.30 2024    HEPBSAG Negative 2024    ABO AB 2024    RH Negative 2024    ABSCRN Negative 2024    RQV1CBV9 Non Reactive 2024    HEPCVIRUSABY Non Reactive 2024    AVX9ABYY 135 2017    STREPGPB Positive (A) 2022    URINECX >100,000 CFU/mL Mixed Jolanta Isolated 10/20/2022       U/S:US Ob 14 + Weeks Single or First Gestation (2024 09:10)     1. IUP 22w6d  2. Routine care   3. Glucola CBC next time  4. BTL

## 2024-07-11 ENCOUNTER — ROUTINE PRENATAL (OUTPATIENT)
Dept: OBSTETRICS AND GYNECOLOGY | Facility: CLINIC | Age: 24
End: 2024-07-11
Payer: COMMERCIAL

## 2024-07-11 DIAGNOSIS — O26.893 RH NEGATIVE STATUS DURING PREGNANCY IN THIRD TRIMESTER: ICD-10-CM

## 2024-07-11 DIAGNOSIS — Z67.91 RH NEGATIVE STATUS DURING PREGNANCY IN THIRD TRIMESTER: ICD-10-CM

## 2024-07-11 DIAGNOSIS — Z30.2 REQUEST FOR STERILIZATION: ICD-10-CM

## 2024-07-11 DIAGNOSIS — Z13.1 DIABETES MELLITUS SCREENING: ICD-10-CM

## 2024-07-11 DIAGNOSIS — Z34.92 PRENATAL CARE IN SECOND TRIMESTER: Primary | ICD-10-CM

## 2024-07-11 LAB
BASOPHILS # BLD AUTO: 0.03 10*3/MM3 (ref 0–0.2)
BASOPHILS NFR BLD AUTO: 0.3 % (ref 0–1.5)
EOSINOPHIL # BLD AUTO: 0.22 10*3/MM3 (ref 0–0.4)
EOSINOPHIL NFR BLD AUTO: 2.4 % (ref 0.3–6.2)
ERYTHROCYTE [DISTWIDTH] IN BLOOD BY AUTOMATED COUNT: 12.4 % (ref 12.3–15.4)
GLUCOSE 1H P 50 G GLC PO SERPL-MCNC: 117 MG/DL (ref 65–139)
HCT VFR BLD AUTO: 32 % (ref 34–46.6)
HGB BLD-MCNC: 10.8 G/DL (ref 12–15.9)
IMM GRANULOCYTES # BLD AUTO: 0.06 10*3/MM3 (ref 0–0.05)
IMM GRANULOCYTES NFR BLD AUTO: 0.7 % (ref 0–0.5)
LYMPHOCYTES # BLD AUTO: 1.11 10*3/MM3 (ref 0.7–3.1)
LYMPHOCYTES NFR BLD AUTO: 12.2 % (ref 19.6–45.3)
MCH RBC QN AUTO: 33 PG (ref 26.6–33)
MCHC RBC AUTO-ENTMCNC: 33.8 G/DL (ref 31.5–35.7)
MCV RBC AUTO: 97.9 FL (ref 79–97)
MONOCYTES # BLD AUTO: 0.47 10*3/MM3 (ref 0.1–0.9)
MONOCYTES NFR BLD AUTO: 5.1 % (ref 5–12)
NEUTROPHILS # BLD AUTO: 7.24 10*3/MM3 (ref 1.7–7)
NEUTROPHILS NFR BLD AUTO: 79.3 % (ref 42.7–76)
NRBC BLD AUTO-RTO: 0 /100 WBC (ref 0–0.2)
PLATELET # BLD AUTO: 144 10*3/MM3 (ref 140–450)
RBC # BLD AUTO: 3.27 10*6/MM3 (ref 3.77–5.28)
WBC # BLD AUTO: 9.13 10*3/MM3 (ref 3.4–10.8)

## 2024-07-11 PROCEDURE — 99213 OFFICE O/P EST LOW 20 MIN: CPT | Performed by: OBSTETRICS & GYNECOLOGY

## 2024-07-24 PROBLEM — Z67.91 RH NEGATIVE STATUS DURING PREGNANCY IN THIRD TRIMESTER: Status: ACTIVE | Noted: 2024-07-24

## 2024-07-24 PROBLEM — O26.893 RH NEGATIVE STATUS DURING PREGNANCY IN THIRD TRIMESTER: Status: ACTIVE | Noted: 2024-07-24

## 2024-07-24 PROBLEM — Z30.2 REQUEST FOR STERILIZATION: Status: ACTIVE | Noted: 2024-07-24

## 2024-07-24 NOTE — PROGRESS NOTES
** NOTE FOR VISIT ON  **    Prenatal Care Visit    Subjective   Chief Complaint   Patient presents with    Routine Prenatal Visit     Glucola, swelling on left side, pain on right side. Wants to sign tubal papers       History:   Summer is a  currently at 26w6d who presents for a prenatal care visit today.    No major issues.    Social History    Tobacco Use      Smoking status: Every Day        Packs/day: 0.25        Years: 0.3 packs/day for 10.0 years (2.5 ttl pk-yrs)        Types: Cigarettes      Smokeless tobacco: Never      Tobacco comments: 4-5 cig/day       Objective   LMP 2023   Physical Exam:  Normal, gestational age-appropriate exam today        Plan   Medical Decision Making:    I have reviewed the prenatal labs and ultrasound(s) today. I have reviewed the most recent prenatal progress note(s).    Diagnosis: Supervision of high risk pregnancy   Short interval pregnancy  Rh NEG  Desire for permanent sterilization   Tests/Orders/Rx today: Orders Placed This Encounter   Procedures    Gestational Screen 1 Hr ([facility])     Order Specific Question:   Release to patient     Answer:   Routine Release [[id number]]    CBC & Differential     Order Specific Question:   Manual Differential     Answer:   No     Order Specific Question:   Release to patient     Answer:   Routine Release [[id number]]       Medication Management: None     Topics discussed: Prenatal care milestones  kick counts and fetal movement  PIH precautions   labor signs and symptoms   Rhogam NEXT VISIT  NIPS NEG  Tubal r/b/a   Tests next visit: none   Next visit: 2 week(s)     Elmer Gaitan MD  Obstetrics and Gynecology  [facility]

## 2024-08-01 ENCOUNTER — ROUTINE PRENATAL (OUTPATIENT)
Dept: OBSTETRICS AND GYNECOLOGY | Facility: CLINIC | Age: 24
End: 2024-08-01
Payer: COMMERCIAL

## 2024-08-01 VITALS — SYSTOLIC BLOOD PRESSURE: 112 MMHG | BODY MASS INDEX: 27.46 KG/M2 | DIASTOLIC BLOOD PRESSURE: 68 MMHG | WEIGHT: 160 LBS

## 2024-08-01 DIAGNOSIS — Z67.91 RH NEGATIVE STATUS DURING PREGNANCY IN THIRD TRIMESTER: ICD-10-CM

## 2024-08-01 DIAGNOSIS — Z30.2 REQUEST FOR STERILIZATION: ICD-10-CM

## 2024-08-01 DIAGNOSIS — O26.893 RH NEGATIVE STATUS DURING PREGNANCY IN THIRD TRIMESTER: ICD-10-CM

## 2024-08-01 DIAGNOSIS — Z34.83 ENCOUNTER FOR SUPERVISION OF OTHER NORMAL PREGNANCY IN THIRD TRIMESTER: Primary | ICD-10-CM

## 2024-08-01 RX ORDER — PROMETHAZINE HYDROCHLORIDE 25 MG/1
25 TABLET ORAL
COMMUNITY
Start: 2024-02-05

## 2024-08-01 NOTE — PROGRESS NOTES
Prenatal Care Visit    Subjective   Chief Complaint   Patient presents with    Routine Prenatal Visit     No concerns. Rhogam given.      History:   Summer is a  currently at 29w6d who presents for a prenatal care visit today.    Denies CTX but reports pelvic pain/ pressure. Denies VB, LOF. Reports (+) FM.     Objective   /68   Wt 72.6 kg (160 lb)   LMP 2023   BMI 27.46 kg/m²   Physical Exam:  Normal, gestational age-appropriate exam today      Assessment & Plan     IUP @ 29w6d  Routine care: I have reviewed the prenatal labs and ultrasound(s) today. I have reviewed the most recent prenatal progress note(s).   Rh (-): Rhogam today  Request for sterilization: FF signed 24     Diagnosis Plan   1. Encounter for supervision of other normal pregnancy in third trimester        2. Rh negative status during pregnancy in third trimester  Rhogam Immune Globulin Immunization      3. Request for sterilization           Medication Management: continue PNV, Rhogam today    Topics discussed: Prenatal care milestones  TDAP vaccination  Kick counts and fetal movement   labor signs and symptoms  Tubal risks, benefits   Tests next visit: U/S for EFW   Next visit: 2-3 week(s)     Jana Corona MD  Obstetrics and Gynecology  [facility]

## 2024-08-16 ENCOUNTER — ROUTINE PRENATAL (OUTPATIENT)
Dept: OBSTETRICS AND GYNECOLOGY | Facility: CLINIC | Age: 24
End: 2024-08-16
Payer: COMMERCIAL

## 2024-08-16 VITALS — SYSTOLIC BLOOD PRESSURE: 112 MMHG | WEIGHT: 158 LBS | DIASTOLIC BLOOD PRESSURE: 70 MMHG | BODY MASS INDEX: 27.12 KG/M2

## 2024-08-16 DIAGNOSIS — Z34.83 ENCOUNTER FOR SUPERVISION OF OTHER NORMAL PREGNANCY IN THIRD TRIMESTER: ICD-10-CM

## 2024-08-16 DIAGNOSIS — Z67.91 RH NEGATIVE STATUS DURING PREGNANCY IN THIRD TRIMESTER: Primary | ICD-10-CM

## 2024-08-16 DIAGNOSIS — O26.893 RH NEGATIVE STATUS DURING PREGNANCY IN THIRD TRIMESTER: Primary | ICD-10-CM

## 2024-08-16 NOTE — PROGRESS NOTES
Chief Complaint   Patient presents with    Routine Prenatal Visit     Growth scan done today       HPI: Summer is a  currently at 32w0d here for prenatal visit who reports the following:  Baby is active. She has noticed BH ctxs and called [facility] a few days ago but didn't go in. She has continued to have irregular ctxs. She denies leakage of fluid or vaginal bleeding.                EXAM:     Vitals:    24 0938   BP: 112/70      Abdomen:   See prenatal flowsheet as noted and reviewed, soft, nontender   Pelvic:  FT/50%/high posterior   Urine:  See prenatal flowsheet as noted and reviewed    Lab Results   Component Value Date    ABO AB 2024    RH Negative 2024    ABSCRN Negative 2024       MDM:  Impression: Supervision of low risk pregnancy  Rh negative   Tests done today: U/S for EFW - 4#6oz, 55%, KESHA 24.08  US Ob Follow Up Transabdominal Approach (2024 09:07)    Topics discussed: kick counts and fetal movement   labor signs and symptoms  Reviewed OB labs   Tests next visit: US for KESHA                RTO:                        2 weeks    This note was electronically signed.  Nano Santos, APRN  2024

## 2024-08-28 ENCOUNTER — ROUTINE PRENATAL (OUTPATIENT)
Dept: OBSTETRICS AND GYNECOLOGY | Facility: CLINIC | Age: 24
End: 2024-08-28
Payer: COMMERCIAL

## 2024-08-28 VITALS — DIASTOLIC BLOOD PRESSURE: 58 MMHG | SYSTOLIC BLOOD PRESSURE: 114 MMHG | WEIGHT: 158 LBS | BODY MASS INDEX: 27.12 KG/M2

## 2024-08-28 DIAGNOSIS — O09.93 ENCOUNTER FOR SUPERVISION OF HIGH RISK PREGNANCY IN THIRD TRIMESTER, ANTEPARTUM: Primary | ICD-10-CM

## 2024-08-28 DIAGNOSIS — O99.019 IRON DEFICIENCY ANEMIA DURING PREGNANCY: ICD-10-CM

## 2024-08-28 DIAGNOSIS — O40.3XX0 POLYHYDRAMNIOS IN THIRD TRIMESTER COMPLICATION, SINGLE OR UNSPECIFIED FETUS: ICD-10-CM

## 2024-08-28 DIAGNOSIS — O26.893 RH NEGATIVE STATUS DURING PREGNANCY IN THIRD TRIMESTER: ICD-10-CM

## 2024-08-28 DIAGNOSIS — Z67.91 RH NEGATIVE STATUS DURING PREGNANCY IN THIRD TRIMESTER: ICD-10-CM

## 2024-08-28 DIAGNOSIS — D50.9 IRON DEFICIENCY ANEMIA DURING PREGNANCY: ICD-10-CM

## 2024-08-28 DIAGNOSIS — O47.00 PRETERM UTERINE CONTRACTIONS, ANTEPARTUM: ICD-10-CM

## 2024-08-28 NOTE — PROGRESS NOTES
Chief Complaint  Routine Prenatal Visit (Patient complains of contractions and back pain. )    History of Present Illness:  Summer is a  currently at 33w5d who presents today with complaints of contractions and low back pain.  Patient reports it was severe last night.  She did not go to labor gross.  She reports previously going to labor gross and being told she was fingertip dilated.  She has not been able to time her contractions.  She denies any vaginal bleeding or spotting.  She has not been leaking any fluid.  She does report good fetal movement.  Repeat scan is obtained today.  Patient is taking her prenatal vitamins.    Exam:  Vitals:  See prenatal flowsheet as noted and reviewed  General: Alert, cooperative, and does not appear in any distress  Abdomen:   See prenatal flowsheet as noted and reviewed    Uterus gravid, non-tender; no palpable masses    No guarding or rebound tenderness  Pelvic:  See prenatal flowsheet as noted and reviewed    Cervix fingertip dilated/50%/posterior  Ext:  See prenatal flowsheet as noted and reviewed    Moves extremities well, no cyanosis and no redness  Urine:  See prenatal flowsheet as noted and reviewed    Data Review:  The following data was reviewed by: Laly Damian MD on 2024:  Prenatal Labs:  Lab Results   Component Value Date    HGB 10.8 (L) 2024    RUBELLAABIGG 1.30 2024    HEPBSAG Negative 2024    ABO AB 2024    RH Negative 2024    ABSCRN Negative 2024    YXN8CGE6 Non Reactive 2024    HEPCVIRUSABY Non Reactive 2024    LTQ6PLKO 135 2017    STREPGPB Positive (A) 2022    URINECX >100,000 CFU/mL Mixed Jolanta Isolated 10/20/2022       No visits with results within 1 Month(s) from this visit.   Latest known visit with results is:   Routine Prenatal on 2024   Component Date Value    Gestational Diabetes Scr* 2024 117     WBC 2024 9.13     RBC 2024 3.27 (L)     Hemoglobin 2024  10.8 (L)     Hematocrit 2024 32.0 (L)     MCV 2024 97.9 (H)     MCH 2024 33.0     MCHC 2024 33.8     RDW 2024 12.4     Platelets 2024 144     Neutrophil Rel % 2024 79.3 (H)     Lymphocyte Rel % 2024 12.2 (L)     Monocyte Rel % 2024 5.1     Eosinophil Rel % 2024 2.4     Basophil Rel % 2024 0.3     Neutrophils Absolute 2024 7.24 (H)     Lymphocytes Absolute 2024 1.11     Monocytes Absolute 2024 0.47     Eosinophils Absolute 2024 0.22     Basophils Absolute 2024 0.03     Immature Granulocyte Rel* 2024 0.7 (H)     Immature Grans Absolute 2024 0.06 (H)     nRBC 2024 0.0      Imaging:  US Ob Limited 1 + Fetuses  Summer Elmore  : 2000  MRN: [id number]  Date: 2024    Reason for exam/History: Follow-up KESHA    Ultrasound images are reviewed.  There is noted to be a viable   intrauterine pregnancy.   The fetal heart rate was normal.   The fetus was   noted to be active.  The infant was in the vertex presentation.  Fetal   heart tones 141 bpm.  KESHA 25.44 cm.    The exam limitations noted:  none    See ultrasound report for measurements and structures identified.    Laly Damian MD, RDMS  [facility]  OB GYN [place]      Medical Records:  None    Assessment and Plan:  Problem List Items Addressed This Visit          Gravid and     Rh negative status during pregnancy in third trimester  Patient previously received RhoGAM.     Other Visit Diagnoses       Encounter for supervision of high risk pregnancy in third trimester, antepartum    -  Primary  Topics discussed:     iron supplementation  kick counts and fetal movement  PIH precautions   labor signs and symptoms  tubal risks, benefits -patient has signed tubal papers as noted.  Repeat imaging next visit.     uterine contractions, antepartum      Patient informed to stay off work.   labor precautions and  instructions have been given.  Recommend pelvic rest as well as modified bedrest.  Patient to go to labor and delivery if worsening and/or changes in her symptoms.    Polyhydramnios in third trimester complication, single or unspecified fetus      Patient informed regarding the findings on ultrasound.  Kick counts have been given.  Follow-up BPP as discussed.  Glucola normal.    Relevant Orders    US Fetal Biophysical Profile;Without Non-Stress Testing    Iron deficiency anemia during pregnancy      Patient to continue her prenatal vitamins consistently.          Follow Up/Instructions:  Follow up as scheduled.  Patient was given instructions and counseling regarding her condition or for health maintenance advice. Please see specific information pulled into the AVS if appropriate.     Note: Speech recognition transcription software may have been used to dictate portions of this document.  An attempt at proofreading has been made though minor errors in transcription may still be present.    This note was electronically signed.  Laly Damian M.D.

## 2024-09-05 ENCOUNTER — ROUTINE PRENATAL (OUTPATIENT)
Dept: OBSTETRICS AND GYNECOLOGY | Facility: CLINIC | Age: 24
End: 2024-09-05
Payer: COMMERCIAL

## 2024-09-05 VITALS — DIASTOLIC BLOOD PRESSURE: 62 MMHG | WEIGHT: 160 LBS | SYSTOLIC BLOOD PRESSURE: 104 MMHG | BODY MASS INDEX: 27.46 KG/M2

## 2024-09-05 DIAGNOSIS — Z34.93 PRENATAL CARE IN THIRD TRIMESTER: Primary | ICD-10-CM

## 2024-09-05 DIAGNOSIS — O26.893 RH NEGATIVE STATUS DURING PREGNANCY IN THIRD TRIMESTER: ICD-10-CM

## 2024-09-05 DIAGNOSIS — Z67.91 RH NEGATIVE STATUS DURING PREGNANCY IN THIRD TRIMESTER: ICD-10-CM

## 2024-09-05 DIAGNOSIS — Z30.2 REQUEST FOR STERILIZATION: ICD-10-CM

## 2024-09-11 NOTE — PROGRESS NOTES
** NOTE FOR VISIT ON  **    Prenatal Care Visit    Subjective   Chief Complaint   Patient presents with    Routine Prenatal Visit     No complaints       History:   Summer is a  currently at 34w5d who presents for a prenatal care visit today.    No major issues.    Social History    Tobacco Use      Smoking status: Every Day        Packs/day: 0.25        Years: 0.3 packs/day for 10.0 years (2.5 ttl pk-yrs)        Types: Cigarettes      Smokeless tobacco: Never      Tobacco comments: 4-5 cig/day       Objective   /62   Wt 72.6 kg (160 lb)   LMP 2023   BMI 27.46 kg/m²   Physical Exam:  Normal, gestational age-appropriate exam today        Plan   Medical Decision Making:    I have reviewed the prenatal labs and ultrasound(s) today. I have reviewed the most recent prenatal progress note(s).    Diagnosis: Supervision of high risk pregnancy   Short interval pregnancy  Rh NEG  Desire for permanent sterilization   Tests/Orders/Rx today: No orders of the defined types were placed in this encounter.      Medication Management: None     Topics discussed: Prenatal care milestones  kick counts and fetal movement  PIH precautions   labor signs and symptoms   Rhogam on   NIPS NEG  Tubal r/b/a   Tests next visit: GBS testing   Next visit: 1 week(s)     Elmer Gaitan MD  Obstetrics and Gynecology  [facility]

## 2024-09-12 ENCOUNTER — ROUTINE PRENATAL (OUTPATIENT)
Dept: OBSTETRICS AND GYNECOLOGY | Facility: CLINIC | Age: 24
End: 2024-09-12
Payer: COMMERCIAL

## 2024-09-12 VITALS — WEIGHT: 157 LBS | DIASTOLIC BLOOD PRESSURE: 74 MMHG | SYSTOLIC BLOOD PRESSURE: 118 MMHG | BODY MASS INDEX: 26.95 KG/M2

## 2024-09-12 DIAGNOSIS — Z34.83 ENCOUNTER FOR SUPERVISION OF OTHER NORMAL PREGNANCY IN THIRD TRIMESTER: Primary | ICD-10-CM

## 2024-09-12 PROCEDURE — 99213 OFFICE O/P EST LOW 20 MIN: CPT | Performed by: MIDWIFE

## 2024-09-12 NOTE — PROGRESS NOTES
Chief Complaint   Patient presents with    Routine Prenatal Visit     Repeat growth/GBS done today        HPI: Summer is a  currently at 35w6d here for prenatal visit who reports the following:  Baby is active.  She has been having more frequent ctxs but denies leakage of fluid or vaginal bleeding.                EXAM:     Vitals:    24 1109   BP: 118/74      Abdomen:   See prenatal flowsheet as noted and reviewed, soft, nontender   Pelvic:  /-3 posterior   Urine:  See prenatal flowsheet as noted and reviewed    Lab Results   Component Value Date    ABO AB 2024    RH Negative 2024    ABSCRN Negative 2024       MDM:  Impression: Supervision of low risk pregnancy  Polyhydramnios  Anemia in pregnancy   Tests done today: BPP -   US Fetal Biophysical Profile;Without Non-Stress Testing (2024 11:13)   GBS testing   Topics discussed: kick counts and fetal movement   labor signs and symptoms  Reviewed OB labs   Tests next visit: none                RTO:                        1 weeks    This note was electronically signed.  Nano Santos, INGE  2024

## 2024-09-16 LAB — B-HEM STREP SPEC QL CULT: NEGATIVE

## 2024-09-17 ENCOUNTER — ROUTINE PRENATAL (OUTPATIENT)
Dept: OBSTETRICS AND GYNECOLOGY | Facility: CLINIC | Age: 24
End: 2024-09-17
Payer: COMMERCIAL

## 2024-09-17 VITALS — SYSTOLIC BLOOD PRESSURE: 112 MMHG | DIASTOLIC BLOOD PRESSURE: 76 MMHG | BODY MASS INDEX: 27.29 KG/M2 | WEIGHT: 159 LBS

## 2024-09-17 DIAGNOSIS — Z34.83 ENCOUNTER FOR SUPERVISION OF OTHER NORMAL PREGNANCY IN THIRD TRIMESTER: Primary | ICD-10-CM

## 2024-09-17 PROCEDURE — 99213 OFFICE O/P EST LOW 20 MIN: CPT | Performed by: MIDWIFE

## 2024-09-22 ENCOUNTER — HOSPITAL ENCOUNTER (OUTPATIENT)
Facility: HOSPITAL | Age: 24
Discharge: HOME | End: 2024-09-22
Attending: MIDWIFE | Admitting: MIDWIFE
Payer: COMMERCIAL

## 2024-09-22 VITALS
BODY MASS INDEX: 27.76 KG/M2 | WEIGHT: 162.6 LBS | TEMPERATURE: 98.6 F | HEIGHT: 64 IN | HEART RATE: 74 BPM | SYSTOLIC BLOOD PRESSURE: 99 MMHG | RESPIRATION RATE: 16 BRPM | DIASTOLIC BLOOD PRESSURE: 58 MMHG | OXYGEN SATURATION: 98 %

## 2024-09-22 LAB
BILIRUB BLD-MCNC: NEGATIVE MG/DL
CLARITY, POC: CLEAR
COLOR UR: YELLOW
GLUCOSE UR STRIP-MCNC: NEGATIVE MG/DL
KETONES UR QL: NEGATIVE
LEUKOCYTE EST, POC: NEGATIVE
NITRITE UR-MCNC: NEGATIVE MG/ML
PH UR: 7 [PH] (ref 5–8)
PROT UR STRIP-MCNC: NEGATIVE MG/DL
RBC # UR STRIP: NEGATIVE /UL
SP GR UR: 1 (ref 1–1.03)
UROBILINOGEN UR QL: NORMAL

## 2024-09-22 PROCEDURE — 59025 FETAL NON-STRESS TEST: CPT

## 2024-09-22 PROCEDURE — G0463 HOSPITAL OUTPT CLINIC VISIT: HCPCS

## 2024-09-22 PROCEDURE — 81002 URINALYSIS NONAUTO W/O SCOPE: CPT | Performed by: MIDWIFE

## 2024-09-22 PROCEDURE — 59025 FETAL NON-STRESS TEST: CPT | Performed by: MIDWIFE

## 2024-09-22 RX ORDER — SODIUM CHLORIDE 0.9 % (FLUSH) 0.9 %
10 SYRINGE (ML) INJECTION AS NEEDED
Status: DISCONTINUED | OUTPATIENT
Start: 2024-09-22 | End: 2024-09-22 | Stop reason: HOSPADM

## 2024-09-22 RX ORDER — SODIUM CHLORIDE 0.9 % (FLUSH) 0.9 %
10 SYRINGE (ML) INJECTION EVERY 12 HOURS SCHEDULED
Status: DISCONTINUED | OUTPATIENT
Start: 2024-09-22 | End: 2024-09-22 | Stop reason: HOSPADM

## 2024-09-22 RX ORDER — ACETAMINOPHEN 500 MG
1000 TABLET ORAL ONCE
Status: COMPLETED | OUTPATIENT
Start: 2024-09-22 | End: 2024-09-22

## 2024-09-22 RX ORDER — LIDOCAINE HYDROCHLORIDE 10 MG/ML
0.5 INJECTION, SOLUTION INFILTRATION; PERINEURAL ONCE AS NEEDED
Status: DISCONTINUED | OUTPATIENT
Start: 2024-09-22 | End: 2024-09-22 | Stop reason: HOSPADM

## 2024-09-22 RX ORDER — SODIUM CHLORIDE 9 MG/ML
40 INJECTION, SOLUTION INTRAVENOUS AS NEEDED
Status: DISCONTINUED | OUTPATIENT
Start: 2024-09-22 | End: 2024-09-22 | Stop reason: HOSPADM

## 2024-09-22 RX ADMIN — ACETAMINOPHEN 1000 MG: 500 TABLET, FILM COATED ORAL at 01:52

## 2024-09-24 ENCOUNTER — ROUTINE PRENATAL (OUTPATIENT)
Dept: OBSTETRICS AND GYNECOLOGY | Facility: CLINIC | Age: 24
End: 2024-09-24
Payer: COMMERCIAL

## 2024-09-24 VITALS — WEIGHT: 161 LBS | BODY MASS INDEX: 27.64 KG/M2 | SYSTOLIC BLOOD PRESSURE: 112 MMHG | DIASTOLIC BLOOD PRESSURE: 60 MMHG

## 2024-09-24 DIAGNOSIS — Z34.83 ENCOUNTER FOR SUPERVISION OF OTHER NORMAL PREGNANCY IN THIRD TRIMESTER: Primary | ICD-10-CM

## 2024-09-24 DIAGNOSIS — O47.9 IRREGULAR UTERINE CONTRACTIONS: ICD-10-CM

## 2024-09-24 DIAGNOSIS — D50.9 IRON DEFICIENCY ANEMIA DURING PREGNANCY: ICD-10-CM

## 2024-09-24 DIAGNOSIS — O26.893 RH NEGATIVE STATUS DURING PREGNANCY IN THIRD TRIMESTER: ICD-10-CM

## 2024-09-24 DIAGNOSIS — Z30.2 REQUEST FOR STERILIZATION: ICD-10-CM

## 2024-09-24 DIAGNOSIS — O99.019 IRON DEFICIENCY ANEMIA DURING PREGNANCY: ICD-10-CM

## 2024-09-24 DIAGNOSIS — M25.551 BILATERAL HIP PAIN: ICD-10-CM

## 2024-09-24 DIAGNOSIS — Z67.91 RH NEGATIVE STATUS DURING PREGNANCY IN THIRD TRIMESTER: ICD-10-CM

## 2024-09-24 DIAGNOSIS — M25.552 BILATERAL HIP PAIN: ICD-10-CM

## 2024-09-24 PROCEDURE — 99214 OFFICE O/P EST MOD 30 MIN: CPT | Performed by: OBSTETRICS & GYNECOLOGY

## 2024-09-28 ENCOUNTER — HOSPITAL ENCOUNTER (OUTPATIENT)
Facility: HOSPITAL | Age: 24
Discharge: HOME | End: 2024-09-28
Attending: OBSTETRICS & GYNECOLOGY | Admitting: OBSTETRICS & GYNECOLOGY
Payer: COMMERCIAL

## 2024-09-28 VITALS
BODY MASS INDEX: 27.31 KG/M2 | SYSTOLIC BLOOD PRESSURE: 113 MMHG | OXYGEN SATURATION: 100 % | RESPIRATION RATE: 16 BRPM | DIASTOLIC BLOOD PRESSURE: 59 MMHG | WEIGHT: 160 LBS | HEART RATE: 78 BPM | TEMPERATURE: 98.2 F | HEIGHT: 64 IN

## 2024-09-28 LAB
A1 MICROGLOB PLACENTAL VAG QL: NEGATIVE
BILIRUB BLD-MCNC: NEGATIVE MG/DL
BILIRUB UR QL STRIP: NEGATIVE
CLARITY UR: ABNORMAL
CLARITY, POC: CLEAR
COLOR UR: YELLOW
COLOR UR: YELLOW
GLUCOSE UR STRIP-MCNC: NEGATIVE MG/DL
GLUCOSE UR STRIP-MCNC: NEGATIVE MG/DL
HGB UR QL STRIP.AUTO: NEGATIVE
KETONES UR QL STRIP: NEGATIVE
KETONES UR QL: NEGATIVE
LEUKOCYTE EST, POC: NEGATIVE
LEUKOCYTE ESTERASE UR QL STRIP.AUTO: NEGATIVE
NITRITE UR QL STRIP: NEGATIVE
NITRITE UR-MCNC: NEGATIVE MG/ML
PH UR STRIP.AUTO: 8.5 [PH] (ref 5–8)
PH UR: 8 [PH] (ref 5–8)
PROT UR QL STRIP: NEGATIVE
PROT UR STRIP-MCNC: ABNORMAL MG/DL
RBC # UR STRIP: ABNORMAL /UL
SP GR UR STRIP: 1.01 (ref 1–1.03)
SP GR UR: 1.01 (ref 1–1.03)
UROBILINOGEN UR QL STRIP: ABNORMAL
UROBILINOGEN UR QL: NORMAL

## 2024-09-28 PROCEDURE — 87086 URINE CULTURE/COLONY COUNT: CPT | Performed by: OBSTETRICS & GYNECOLOGY

## 2024-09-28 PROCEDURE — G0463 HOSPITAL OUTPT CLINIC VISIT: HCPCS

## 2024-09-28 PROCEDURE — 59025 FETAL NON-STRESS TEST: CPT

## 2024-09-28 PROCEDURE — 81002 URINALYSIS NONAUTO W/O SCOPE: CPT | Performed by: OBSTETRICS & GYNECOLOGY

## 2024-09-28 PROCEDURE — 84112 EVAL AMNIOTIC FLUID PROTEIN: CPT | Performed by: OBSTETRICS & GYNECOLOGY

## 2024-09-28 PROCEDURE — 81003 URINALYSIS AUTO W/O SCOPE: CPT | Performed by: OBSTETRICS & GYNECOLOGY

## 2024-09-28 RX ORDER — LIDOCAINE HYDROCHLORIDE 10 MG/ML
0.5 INJECTION, SOLUTION INFILTRATION; PERINEURAL ONCE AS NEEDED
Status: DISCONTINUED | OUTPATIENT
Start: 2024-09-28 | End: 2024-09-28 | Stop reason: HOSPADM

## 2024-09-28 RX ORDER — SODIUM CHLORIDE 0.9 % (FLUSH) 0.9 %
10 SYRINGE (ML) INJECTION AS NEEDED
Status: DISCONTINUED | OUTPATIENT
Start: 2024-09-28 | End: 2024-09-28 | Stop reason: HOSPADM

## 2024-09-28 RX ORDER — SODIUM CHLORIDE 9 MG/ML
40 INJECTION, SOLUTION INTRAVENOUS AS NEEDED
Status: DISCONTINUED | OUTPATIENT
Start: 2024-09-28 | End: 2024-09-28 | Stop reason: HOSPADM

## 2024-09-28 RX ORDER — SODIUM CHLORIDE 0.9 % (FLUSH) 0.9 %
10 SYRINGE (ML) INJECTION EVERY 12 HOURS SCHEDULED
Status: DISCONTINUED | OUTPATIENT
Start: 2024-09-28 | End: 2024-09-28 | Stop reason: HOSPADM

## 2024-09-28 NOTE — NON STRESS TEST
"Triage Note - Nursing Documentation  Labor and Delivery Admission Log    Summer Elmore  : 2000  MRN: [id number]  CSN: [id number]    Date in / Time in:  2024  Time in:     Date out / Time out:    Time out: 1728    Nurse: Luana Lam, RN    Patient Info: She is a 24 y.o. year old  at 38w1d with an PRIYA of 10/11/2024, by Ultrasound who was seen on the [facility].    Chief Complaint:   Chief Complaint   Patient presents with    Contractions     \"I've been having contractions about 8-10 minutes apart this evening\"       Provider Instructions / Disposition: PO hydration, VE done, amnisure negative, DC home, FU in office as scheduled, signs and symptoms to return discussed, patient verbalized understanding.    Patient Active Problem List   Diagnosis    Rh negative status during pregnancy in third trimester    Request for sterilization       NST Documentation (Only applicable > 32 weeks): Interpretation A  Nonstress Test Interpretation A: Reactive (24 1720 : Luana Lam, RN)    "

## 2024-09-30 LAB — BACTERIA SPEC AEROBE CULT: NO GROWTH

## 2024-10-01 ENCOUNTER — PREP FOR SURGERY (OUTPATIENT)
Dept: OTHER | Facility: HOSPITAL | Age: 24
End: 2024-10-01
Payer: COMMERCIAL

## 2024-10-01 ENCOUNTER — ROUTINE PRENATAL (OUTPATIENT)
Dept: OBSTETRICS AND GYNECOLOGY | Facility: CLINIC | Age: 24
End: 2024-10-01
Payer: COMMERCIAL

## 2024-10-01 VITALS — SYSTOLIC BLOOD PRESSURE: 116 MMHG | DIASTOLIC BLOOD PRESSURE: 60 MMHG | WEIGHT: 162 LBS | BODY MASS INDEX: 27.81 KG/M2

## 2024-10-01 DIAGNOSIS — M25.552 BILATERAL HIP PAIN: ICD-10-CM

## 2024-10-01 DIAGNOSIS — Z67.91 RH NEGATIVE STATUS DURING PREGNANCY IN THIRD TRIMESTER: ICD-10-CM

## 2024-10-01 DIAGNOSIS — O26.893 RH NEGATIVE STATUS DURING PREGNANCY IN THIRD TRIMESTER: ICD-10-CM

## 2024-10-01 DIAGNOSIS — O47.9 IRREGULAR UTERINE CONTRACTIONS: ICD-10-CM

## 2024-10-01 DIAGNOSIS — O99.019 IRON DEFICIENCY ANEMIA DURING PREGNANCY: ICD-10-CM

## 2024-10-01 DIAGNOSIS — Z30.2 REQUEST FOR STERILIZATION: ICD-10-CM

## 2024-10-01 DIAGNOSIS — D50.9 IRON DEFICIENCY ANEMIA DURING PREGNANCY: ICD-10-CM

## 2024-10-01 DIAGNOSIS — Z34.83 ENCOUNTER FOR SUPERVISION OF OTHER NORMAL PREGNANCY IN THIRD TRIMESTER: Primary | ICD-10-CM

## 2024-10-01 DIAGNOSIS — M25.551 BILATERAL HIP PAIN: ICD-10-CM

## 2024-10-01 PROCEDURE — 99214 OFFICE O/P EST MOD 30 MIN: CPT | Performed by: OBSTETRICS & GYNECOLOGY

## 2024-10-01 RX ORDER — SODIUM CHLORIDE 0.9 % (FLUSH) 0.9 %
10 SYRINGE (ML) INJECTION AS NEEDED
Status: CANCELLED | OUTPATIENT
Start: 2024-10-01

## 2024-10-01 RX ORDER — ACETAMINOPHEN 325 MG/1
650 TABLET ORAL EVERY 4 HOURS PRN
Status: CANCELLED | OUTPATIENT
Start: 2024-10-01

## 2024-10-01 RX ORDER — LIDOCAINE HYDROCHLORIDE 10 MG/ML
0.5 INJECTION, SOLUTION INFILTRATION; PERINEURAL ONCE AS NEEDED
Status: CANCELLED | OUTPATIENT
Start: 2024-10-01

## 2024-10-01 RX ORDER — MISOPROSTOL 200 UG/1
800 TABLET ORAL ONCE AS NEEDED
Status: CANCELLED | OUTPATIENT
Start: 2024-10-01 | End: 2024-10-02

## 2024-10-01 RX ORDER — OXYTOCIN/0.9 % SODIUM CHLORIDE 30/500 ML
250 PLASTIC BAG, INJECTION (ML) INTRAVENOUS CONTINUOUS
Status: CANCELLED | OUTPATIENT
Start: 2024-10-01 | End: 2024-10-01

## 2024-10-01 RX ORDER — PROMETHAZINE HYDROCHLORIDE 12.5 MG/1
12.5 TABLET ORAL EVERY 6 HOURS PRN
Status: CANCELLED | OUTPATIENT
Start: 2024-10-01

## 2024-10-01 RX ORDER — ONDANSETRON 4 MG/1
4 TABLET, ORALLY DISINTEGRATING ORAL ONCE AS NEEDED
Status: CANCELLED | OUTPATIENT
Start: 2024-10-01

## 2024-10-01 RX ORDER — OXYTOCIN/0.9 % SODIUM CHLORIDE 30/500 ML
999 PLASTIC BAG, INJECTION (ML) INTRAVENOUS ONCE
Status: CANCELLED | OUTPATIENT
Start: 2024-10-01 | End: 2024-10-01

## 2024-10-01 RX ORDER — HYDROXYZINE HYDROCHLORIDE 25 MG/1
50 TABLET, FILM COATED ORAL NIGHTLY PRN
Status: CANCELLED | OUTPATIENT
Start: 2024-10-01

## 2024-10-01 RX ORDER — ONDANSETRON 2 MG/ML
4 INJECTION INTRAMUSCULAR; INTRAVENOUS ONCE AS NEEDED
Status: CANCELLED | OUTPATIENT
Start: 2024-10-01

## 2024-10-01 RX ORDER — SODIUM CHLORIDE, SODIUM LACTATE, POTASSIUM CHLORIDE, CALCIUM CHLORIDE 600; 310; 30; 20 MG/100ML; MG/100ML; MG/100ML; MG/100ML
125 INJECTION, SOLUTION INTRAVENOUS CONTINUOUS
Status: CANCELLED | OUTPATIENT
Start: 2024-10-01

## 2024-10-01 RX ORDER — SODIUM CHLORIDE 0.9 % (FLUSH) 0.9 %
10 SYRINGE (ML) INJECTION EVERY 12 HOURS SCHEDULED
Status: CANCELLED | OUTPATIENT
Start: 2024-10-01

## 2024-10-01 RX ORDER — ONDANSETRON 4 MG/1
4 TABLET, ORALLY DISINTEGRATING ORAL EVERY 6 HOURS PRN
Status: CANCELLED | OUTPATIENT
Start: 2024-10-01

## 2024-10-01 RX ORDER — ACETAMINOPHEN 325 MG/1
650 TABLET ORAL ONCE AS NEEDED
Status: CANCELLED | OUTPATIENT
Start: 2024-10-01

## 2024-10-01 RX ORDER — ONDANSETRON 2 MG/ML
4 INJECTION INTRAMUSCULAR; INTRAVENOUS EVERY 6 HOURS PRN
Status: CANCELLED | OUTPATIENT
Start: 2024-10-01

## 2024-10-01 RX ORDER — PROMETHAZINE HYDROCHLORIDE 12.5 MG/1
12.5 SUPPOSITORY RECTAL EVERY 6 HOURS PRN
Status: CANCELLED | OUTPATIENT
Start: 2024-10-01

## 2024-10-01 RX ORDER — SODIUM CHLORIDE 9 MG/ML
40 INJECTION, SOLUTION INTRAVENOUS AS NEEDED
Status: CANCELLED | OUTPATIENT
Start: 2024-10-01

## 2024-10-01 RX ORDER — CARBOPROST TROMETHAMINE 250 UG/ML
250 INJECTION, SOLUTION INTRAMUSCULAR ONCE AS NEEDED
Status: CANCELLED | OUTPATIENT
Start: 2024-10-01 | End: 2024-10-02

## 2024-10-01 RX ORDER — METHYLERGONOVINE MALEATE 0.2 MG/ML
200 INJECTION INTRAVENOUS ONCE AS NEEDED
Status: CANCELLED | OUTPATIENT
Start: 2024-10-01 | End: 2024-10-02

## 2024-10-01 NOTE — PROGRESS NOTES
Chief Complaint  Routine Prenatal Visit (Growth scan today. Patient complains of contractions. )    History of Present Illness:  Summer is a  currently at 38w4d who presents today with complaints of irregular contractions.  Patient had been seen on labor and delivery on Saturday.  She reports having continued irregular contractions.  She is desiring induction of labor.  She continues to have bilateral hip pain as well as suprapubic pain.  She does report good fetal movement.  She is desiring permanent surgical sterilization as well.    Exam:  Vitals:  See prenatal flowsheet as noted and reviewed  General: Alert, cooperative, and does not appear in any distress  Abdomen:   See prenatal flowsheet as noted and reviewed    Uterus gravid, non-tender; no palpable masses    No guarding or rebound tenderness  Pelvic:  See prenatal flowsheet as noted and reviewed    Cervix 2 to 3 cm / 80%/midposition/-3  Ext:  See prenatal flowsheet as noted and reviewed    Moves extremities well, no cyanosis and no redness  Urine:  See prenatal flowsheet as noted and reviewed    Data Review:  The following data was reviewed by: Laly Damian MD on 10/01/2024:  Prenatal Labs:  Lab Results   Component Value Date    HGB 10.8 (L) 2024    RUBELLAABIGG 1.30 2024    HEPBSAG Negative 2024    ABO AB 2024    RH Negative 2024    ABSCRN Negative 2024    WXY0GCV1 Non Reactive 2024    HEPCVIRUSABY Non Reactive 2024    GZA1ZYPG 135 2017    STREPGPB Negative 2024    URINECX No growth 2024       Admission on 2024, Discharged on 2024   Component Date Value    Color 2024 Yellow     Clarity, UA 2024 Clear     Glucose, UA 2024 Negative     Bilirubin 2024 Negative     Ketones, UA 2024 Negative     Specific Gravity  2024 1.010     Blood, UA 2024 Moderate (A)     pH, Urine 2024 8.0     Protein, POC 2024 Trace (A)      Urobilinogen, UA 2024 Normal     Leukocytes 2024 Negative     Nitrite, UA 2024 Negative     Color, UA 2024 Yellow     Appearance, UA 2024 Cloudy (A)     pH, UA 2024 8.5 (H)     Specific [place], UA 2024 1.013     Glucose, UA 2024 Negative     Ketones, UA 2024 Negative     Bilirubin, UA 2024 Negative     Blood, UA 2024 Negative     Protein, UA 2024 Negative     Leuk Esterase, UA 2024 Negative     Nitrite, UA 2024 Negative     Urobilinogen, UA 2024 0.2 E.U./dL     Rupture of Membranes 2024 Negative     Urine Culture 2024 No growth    Admission on 2024, Discharged on 2024   Component Date Value    Color 2024 Yellow     Clarity, UA 2024 Clear     Glucose, UA 2024 Negative     Bilirubin 2024 Negative     Ketones, UA 2024 Negative     Specific Gravity  2024 1.005     Blood, UA 2024 Negative     pH, Urine 2024 7.0     Protein, POC 2024 Negative     Urobilinogen, UA 2024 Normal     Leukocytes 2024 Negative     Nitrite, UA 2024 Negative    Routine Prenatal on 2024   Component Date Value    Strep Gp B Culture 2024 Negative      Imaging:  US Fetal Biophysical Profile;Without Non-Stress Testing  Summer Elmore  : 2000  MRN: [id number]  Date: 2024    Reason for exam/History:  Polyhydramnios    Ultrasound images are reviewed.  There is a single viable intrauterine   pregnancy noted. The fetus was in the vertex presentation.  The fetal   heart rate was normal.      The biophysical profile was 8/8:  2 points for fetal breathing movements,   2 points for fetal tone, 2 points for amniotic fluid volume, and 2 points   for fetal movement.  The KESHA was 23.87 cms.    See official report for measurements and structures identified.    Laly Damian MD, [facility]  OB GYN [facility]  Records:  None    Assessment and Plan:  Problem List Items Addressed This Visit          Genitourinary and Reproductive     Request for sterilization  Tubal papers have been signed as noted.       Gravid and     Rh negative status during pregnancy in third trimester     Other Visit Diagnoses       Encounter for supervision of other normal pregnancy in third trimester    -  Primary  Topics discussed:     induction of labor  iron supplementation  kick counts and fetal movement  labor signs and symptoms  PIH precautions  tubal risks, benefits  Patient desires to schedule induction of labor.  Orders have been placed as noted.  Low-dose Pitocin as discussed.    Bilateral hip pain      Instructions and precautions have been given.  Patient is to call if worsening and/or changes in her symptoms.    Irregular uterine contractions      Labor precautions and instructions have been given.    Iron deficiency anemia during pregnancy      Patient to continue her iron supplements as previously given.          Follow Up/Instructions:  Follow up as scheduled.  Patient was given instructions and counseling regarding her condition or for health maintenance advice. Please see specific information pulled into the AVS if appropriate.     Note: Speech recognition transcription software may have been used to dictate portions of this document.  An attempt at proofreading has been made though minor errors in transcription may still be present.    This note was electronically signed.  Laly Damian M.D.

## 2024-10-03 ENCOUNTER — HOSPITAL ENCOUNTER (INPATIENT)
Facility: HOSPITAL | Age: 24
LOS: 3 days | Discharge: HOME | End: 2024-10-06
Attending: MIDWIFE | Admitting: OBSTETRICS & GYNECOLOGY
Payer: COMMERCIAL

## 2024-10-03 ENCOUNTER — HOSPITAL ENCOUNTER (OUTPATIENT)
Dept: LABOR AND DELIVERY | Facility: HOSPITAL | Age: 24
Discharge: HOME | End: 2024-10-03
Payer: COMMERCIAL

## 2024-10-03 DIAGNOSIS — Z30.2 REQUEST FOR STERILIZATION: Primary | ICD-10-CM

## 2024-10-03 PROBLEM — Z34.90 PREGNANCY: Status: ACTIVE | Noted: 2024-10-03

## 2024-10-03 LAB
BILIRUB BLD-MCNC: NEGATIVE MG/DL
CLARITY, POC: ABNORMAL
COLOR UR: YELLOW
GLUCOSE UR STRIP-MCNC: NEGATIVE MG/DL
KETONES UR QL: NEGATIVE
LEUKOCYTE EST, POC: NEGATIVE
NITRITE UR-MCNC: NEGATIVE MG/ML
PH UR: 7 [PH] (ref 5–8)
PROT UR STRIP-MCNC: ABNORMAL MG/DL
RBC # UR STRIP: NEGATIVE /UL
SP GR UR: 1.01 (ref 1–1.03)
UROBILINOGEN UR QL: NORMAL

## 2024-10-03 PROCEDURE — 86922 COMPATIBILITY TEST ANTIGLOB: CPT

## 2024-10-03 PROCEDURE — 86850 RBC ANTIBODY SCREEN: CPT | Performed by: OBSTETRICS & GYNECOLOGY

## 2024-10-03 PROCEDURE — 86870 RBC ANTIBODY IDENTIFICATION: CPT | Performed by: OBSTETRICS & GYNECOLOGY

## 2024-10-03 PROCEDURE — 81002 URINALYSIS NONAUTO W/O SCOPE: CPT | Performed by: OBSTETRICS & GYNECOLOGY

## 2024-10-03 PROCEDURE — 86901 BLOOD TYPING SEROLOGIC RH(D): CPT | Performed by: OBSTETRICS & GYNECOLOGY

## 2024-10-03 PROCEDURE — 86920 COMPATIBILITY TEST SPIN: CPT

## 2024-10-03 PROCEDURE — 85025 COMPLETE CBC W/AUTO DIFF WBC: CPT | Performed by: OBSTETRICS & GYNECOLOGY

## 2024-10-03 PROCEDURE — 86900 BLOOD TYPING SEROLOGIC ABO: CPT | Performed by: OBSTETRICS & GYNECOLOGY

## 2024-10-03 PROCEDURE — 86780 TREPONEMA PALLIDUM: CPT | Performed by: OBSTETRICS & GYNECOLOGY

## 2024-10-03 PROCEDURE — 59025 FETAL NON-STRESS TEST: CPT

## 2024-10-03 RX ORDER — SODIUM CHLORIDE, SODIUM LACTATE, POTASSIUM CHLORIDE, CALCIUM CHLORIDE 600; 310; 30; 20 MG/100ML; MG/100ML; MG/100ML; MG/100ML
30 INJECTION, SOLUTION INTRAVENOUS CONTINUOUS
Status: DISCONTINUED | OUTPATIENT
Start: 2024-10-03 | End: 2024-10-04

## 2024-10-03 RX ORDER — SODIUM CHLORIDE 0.9 % (FLUSH) 0.9 %
10 SYRINGE (ML) INJECTION AS NEEDED
Status: DISCONTINUED | OUTPATIENT
Start: 2024-10-03 | End: 2024-10-04 | Stop reason: HOSPADM

## 2024-10-03 RX ORDER — PROMETHAZINE HYDROCHLORIDE 12.5 MG/1
12.5 SUPPOSITORY RECTAL EVERY 6 HOURS PRN
Status: DISCONTINUED | OUTPATIENT
Start: 2024-10-03 | End: 2024-10-04 | Stop reason: HOSPADM

## 2024-10-03 RX ORDER — ONDANSETRON 2 MG/ML
4 INJECTION INTRAMUSCULAR; INTRAVENOUS EVERY 6 HOURS PRN
Status: DISCONTINUED | OUTPATIENT
Start: 2024-10-03 | End: 2024-10-04 | Stop reason: HOSPADM

## 2024-10-03 RX ORDER — LIDOCAINE HYDROCHLORIDE 10 MG/ML
0.5 INJECTION, SOLUTION INFILTRATION; PERINEURAL ONCE AS NEEDED
Status: DISCONTINUED | OUTPATIENT
Start: 2024-10-03 | End: 2024-10-04 | Stop reason: HOSPADM

## 2024-10-03 RX ORDER — SODIUM CHLORIDE 0.9 % (FLUSH) 0.9 %
10 SYRINGE (ML) INJECTION EVERY 12 HOURS SCHEDULED
Status: DISCONTINUED | OUTPATIENT
Start: 2024-10-03 | End: 2024-10-04 | Stop reason: HOSPADM

## 2024-10-03 RX ORDER — PROMETHAZINE HYDROCHLORIDE 12.5 MG/1
12.5 TABLET ORAL EVERY 6 HOURS PRN
Status: DISCONTINUED | OUTPATIENT
Start: 2024-10-03 | End: 2024-10-04 | Stop reason: HOSPADM

## 2024-10-03 RX ORDER — HYDROXYZINE HYDROCHLORIDE 25 MG/1
50 TABLET, FILM COATED ORAL NIGHTLY PRN
Status: DISCONTINUED | OUTPATIENT
Start: 2024-10-03 | End: 2024-10-04 | Stop reason: HOSPADM

## 2024-10-03 RX ORDER — ACETAMINOPHEN 325 MG/1
650 TABLET ORAL EVERY 4 HOURS PRN
Status: DISCONTINUED | OUTPATIENT
Start: 2024-10-03 | End: 2024-10-04 | Stop reason: HOSPADM

## 2024-10-03 RX ORDER — SODIUM CHLORIDE 9 MG/ML
40 INJECTION, SOLUTION INTRAVENOUS AS NEEDED
Status: DISCONTINUED | OUTPATIENT
Start: 2024-10-03 | End: 2024-10-04 | Stop reason: HOSPADM

## 2024-10-03 RX ORDER — ONDANSETRON 4 MG/1
4 TABLET, ORALLY DISINTEGRATING ORAL EVERY 6 HOURS PRN
Status: DISCONTINUED | OUTPATIENT
Start: 2024-10-03 | End: 2024-10-04 | Stop reason: HOSPADM

## 2024-10-04 ENCOUNTER — ANESTHESIA EVENT (OUTPATIENT)
Dept: LABOR AND DELIVERY | Facility: HOSPITAL | Age: 24
End: 2024-10-04
Payer: COMMERCIAL

## 2024-10-04 ENCOUNTER — ANESTHESIA (OUTPATIENT)
Dept: LABOR AND DELIVERY | Facility: HOSPITAL | Age: 24
End: 2024-10-04
Payer: COMMERCIAL

## 2024-10-04 LAB
ABO GROUP BLD: NORMAL
ANTI-D: NORMAL
BASOPHILS # BLD AUTO: 0.03 10*3/MM3 (ref 0–0.2)
BASOPHILS NFR BLD AUTO: 0.4 % (ref 0–1.5)
BLD GP AB SCN SERPL QL: POSITIVE
DEPRECATED RDW RBC AUTO: 46.4 FL (ref 37–54)
EOSINOPHIL # BLD AUTO: 0.23 10*3/MM3 (ref 0–0.4)
EOSINOPHIL NFR BLD AUTO: 2.7 % (ref 0.3–6.2)
ERYTHROCYTE [DISTWIDTH] IN BLOOD BY AUTOMATED COUNT: 13.5 % (ref 12.3–15.4)
HCT VFR BLD AUTO: 32.9 % (ref 34–46.6)
HGB BLD-MCNC: 11.2 G/DL (ref 12–15.9)
IMM GRANULOCYTES # BLD AUTO: 0.04 10*3/MM3 (ref 0–0.05)
IMM GRANULOCYTES NFR BLD AUTO: 0.5 % (ref 0–0.5)
LYMPHOCYTES # BLD AUTO: 1.41 10*3/MM3 (ref 0.7–3.1)
LYMPHOCYTES NFR BLD AUTO: 16.7 % (ref 19.6–45.3)
MCH RBC QN AUTO: 32.5 PG (ref 26.6–33)
MCHC RBC AUTO-ENTMCNC: 34 G/DL (ref 31.5–35.7)
MCV RBC AUTO: 95.4 FL (ref 79–97)
MONOCYTES # BLD AUTO: 0.63 10*3/MM3 (ref 0.1–0.9)
MONOCYTES NFR BLD AUTO: 7.5 % (ref 5–12)
NEUTROPHILS NFR BLD AUTO: 6.09 10*3/MM3 (ref 1.7–7)
NEUTROPHILS NFR BLD AUTO: 72.2 % (ref 42.7–76)
NRBC BLD AUTO-RTO: 0 /100 WBC (ref 0–0.2)
PLATELET # BLD AUTO: 154 10*3/MM3 (ref 140–450)
PMV BLD AUTO: 12.5 FL (ref 6–12)
RBC # BLD AUTO: 3.45 10*6/MM3 (ref 3.77–5.28)
RH BLD: NEGATIVE
T&S EXPIRATION DATE: NORMAL
TREPONEMA PALLIDUM IGG+IGM AB [PRESENCE] IN SERUM OR PLASMA BY IMMUNOASSAY: NORMAL
WBC NRBC COR # BLD AUTO: 8.43 10*3/MM3 (ref 3.4–10.8)

## 2024-10-04 PROCEDURE — 25810000003 LACTATED RINGERS SOLUTION: Performed by: OBSTETRICS & GYNECOLOGY

## 2024-10-04 PROCEDURE — 10907ZC DRAINAGE OF AMNIOTIC FLUID, THERAPEUTIC FROM PRODUCTS OF CONCEPTION, VIA NATURAL OR ARTIFICIAL OPENING: ICD-10-PCS | Performed by: OBSTETRICS & GYNECOLOGY

## 2024-10-04 PROCEDURE — 25810000003 LACTATED RINGERS PER 1000 ML: Performed by: MIDWIFE

## 2024-10-04 PROCEDURE — 59410 OBSTETRICAL CARE: CPT | Performed by: MIDWIFE

## 2024-10-04 PROCEDURE — 3E033VJ INTRODUCTION OF OTHER HORMONE INTO PERIPHERAL VEIN, PERCUTANEOUS APPROACH: ICD-10-PCS | Performed by: OBSTETRICS & GYNECOLOGY

## 2024-10-04 PROCEDURE — 25010000002 FENTANYL CITRATE (PF) 50 MCG/ML SOLUTION: Performed by: NURSE ANESTHETIST, CERTIFIED REGISTERED

## 2024-10-04 PROCEDURE — 51703 INSERT BLADDER CATH COMPLEX: CPT

## 2024-10-04 RX ORDER — CITRIC ACID/SODIUM CITRATE 334-500MG
30 SOLUTION, ORAL ORAL ONCE
Status: DISCONTINUED | OUTPATIENT
Start: 2024-10-04 | End: 2024-10-04 | Stop reason: HOSPADM

## 2024-10-04 RX ORDER — PROMETHAZINE HYDROCHLORIDE 25 MG/1
25 TABLET ORAL EVERY 6 HOURS PRN
Status: DISCONTINUED | OUTPATIENT
Start: 2024-10-04 | End: 2024-10-06 | Stop reason: HOSPADM

## 2024-10-04 RX ORDER — OXYTOCIN/0.9 % SODIUM CHLORIDE 30/500 ML
1-20 PLASTIC BAG, INJECTION (ML) INTRAVENOUS
Status: DISCONTINUED | OUTPATIENT
Start: 2024-10-04 | End: 2024-10-04

## 2024-10-04 RX ORDER — PROMETHAZINE HYDROCHLORIDE 12.5 MG/1
12.5 SUPPOSITORY RECTAL EVERY 6 HOURS PRN
Status: DISCONTINUED | OUTPATIENT
Start: 2024-10-04 | End: 2024-10-06 | Stop reason: HOSPADM

## 2024-10-04 RX ORDER — ONDANSETRON 2 MG/ML
4 INJECTION INTRAMUSCULAR; INTRAVENOUS EVERY 6 HOURS PRN
Status: DISCONTINUED | OUTPATIENT
Start: 2024-10-04 | End: 2024-10-06 | Stop reason: HOSPADM

## 2024-10-04 RX ORDER — PRENATAL VIT/IRON FUM/FOLIC AC 27MG-0.8MG
1 TABLET ORAL DAILY
Status: DISCONTINUED | OUTPATIENT
Start: 2024-10-04 | End: 2024-10-06 | Stop reason: HOSPADM

## 2024-10-04 RX ORDER — IBUPROFEN 600 MG/1
600 TABLET, FILM COATED ORAL EVERY 6 HOURS PRN
Status: DISCONTINUED | OUTPATIENT
Start: 2024-10-04 | End: 2024-10-06 | Stop reason: HOSPADM

## 2024-10-04 RX ORDER — ONDANSETRON 4 MG/1
4 TABLET, ORALLY DISINTEGRATING ORAL EVERY 8 HOURS PRN
Status: DISCONTINUED | OUTPATIENT
Start: 2024-10-04 | End: 2024-10-06 | Stop reason: HOSPADM

## 2024-10-04 RX ORDER — HYDROCORTISONE 25 MG/G
1 CREAM TOPICAL 2 TIMES DAILY PRN
Status: DISCONTINUED | OUTPATIENT
Start: 2024-10-04 | End: 2024-10-06 | Stop reason: HOSPADM

## 2024-10-04 RX ORDER — PROMETHAZINE HYDROCHLORIDE 12.5 MG/1
12.5 TABLET ORAL EVERY 6 HOURS PRN
Status: DISCONTINUED | OUTPATIENT
Start: 2024-10-04 | End: 2024-10-04 | Stop reason: HOSPADM

## 2024-10-04 RX ORDER — OXYTOCIN/0.9 % SODIUM CHLORIDE 30/500 ML
125 PLASTIC BAG, INJECTION (ML) INTRAVENOUS CONTINUOUS PRN
Status: DISCONTINUED | OUTPATIENT
Start: 2024-10-04 | End: 2024-10-06 | Stop reason: HOSPADM

## 2024-10-04 RX ORDER — MISOPROSTOL 200 UG/1
800 TABLET ORAL ONCE AS NEEDED
Status: DISCONTINUED | OUTPATIENT
Start: 2024-10-04 | End: 2024-10-04 | Stop reason: HOSPADM

## 2024-10-04 RX ORDER — METHYLERGONOVINE MALEATE 0.2 MG/ML
200 INJECTION INTRAVENOUS ONCE AS NEEDED
Status: DISCONTINUED | OUTPATIENT
Start: 2024-10-04 | End: 2024-10-04 | Stop reason: HOSPADM

## 2024-10-04 RX ORDER — MAGNESIUM CARB/ALUMINUM HYDROX 105-160MG
30 TABLET,CHEWABLE ORAL ONCE
Status: COMPLETED | OUTPATIENT
Start: 2024-10-04 | End: 2024-10-04

## 2024-10-04 RX ORDER — DIPHENHYDRAMINE HCL 25 MG
25 CAPSULE ORAL NIGHTLY PRN
Status: DISCONTINUED | OUTPATIENT
Start: 2024-10-04 | End: 2024-10-06 | Stop reason: HOSPADM

## 2024-10-04 RX ORDER — ONDANSETRON 2 MG/ML
4 INJECTION INTRAMUSCULAR; INTRAVENOUS ONCE AS NEEDED
Status: DISCONTINUED | OUTPATIENT
Start: 2024-10-04 | End: 2024-10-04 | Stop reason: HOSPADM

## 2024-10-04 RX ORDER — ONDANSETRON 4 MG/1
4 TABLET, ORALLY DISINTEGRATING ORAL ONCE AS NEEDED
Status: DISCONTINUED | OUTPATIENT
Start: 2024-10-04 | End: 2024-10-04 | Stop reason: HOSPADM

## 2024-10-04 RX ORDER — FENTANYL CITRATE 50 UG/ML
INJECTION, SOLUTION INTRAMUSCULAR; INTRAVENOUS
Status: COMPLETED
Start: 2024-10-04 | End: 2024-10-04

## 2024-10-04 RX ORDER — OXYTOCIN/0.9 % SODIUM CHLORIDE 30/500 ML
999 PLASTIC BAG, INJECTION (ML) INTRAVENOUS ONCE
Status: DISCONTINUED | OUTPATIENT
Start: 2024-10-04 | End: 2024-10-04 | Stop reason: HOSPADM

## 2024-10-04 RX ORDER — DOCUSATE SODIUM 100 MG/1
100 CAPSULE, LIQUID FILLED ORAL 2 TIMES DAILY PRN
Status: DISCONTINUED | OUTPATIENT
Start: 2024-10-04 | End: 2024-10-06 | Stop reason: HOSPADM

## 2024-10-04 RX ORDER — FAMOTIDINE 10 MG/ML
20 INJECTION, SOLUTION INTRAVENOUS 2 TIMES DAILY
Status: DISCONTINUED | OUTPATIENT
Start: 2024-10-04 | End: 2024-10-04

## 2024-10-04 RX ORDER — BISACODYL 10 MG
10 SUPPOSITORY, RECTAL RECTAL DAILY PRN
Status: DISCONTINUED | OUTPATIENT
Start: 2024-10-05 | End: 2024-10-06 | Stop reason: HOSPADM

## 2024-10-04 RX ORDER — OXYTOCIN/0.9 % SODIUM CHLORIDE 30/500 ML
PLASTIC BAG, INJECTION (ML) INTRAVENOUS
Status: COMPLETED
Start: 2024-10-04 | End: 2024-10-04

## 2024-10-04 RX ORDER — FENTANYL CITRATE 50 UG/ML
INJECTION, SOLUTION INTRAMUSCULAR; INTRAVENOUS AS NEEDED
Status: DISCONTINUED | OUTPATIENT
Start: 2024-10-04 | End: 2024-10-14 | Stop reason: SURG

## 2024-10-04 RX ORDER — EPHEDRINE SULFATE 5 MG/ML
5 INJECTION INTRAVENOUS
Status: DISCONTINUED | OUTPATIENT
Start: 2024-10-04 | End: 2024-10-04 | Stop reason: HOSPADM

## 2024-10-04 RX ORDER — CARBOPROST TROMETHAMINE 250 UG/ML
250 INJECTION, SOLUTION INTRAMUSCULAR ONCE AS NEEDED
Status: DISCONTINUED | OUTPATIENT
Start: 2024-10-04 | End: 2024-10-04 | Stop reason: HOSPADM

## 2024-10-04 RX ORDER — SODIUM CHLORIDE 0.9 % (FLUSH) 0.9 %
1-10 SYRINGE (ML) INJECTION AS NEEDED
Status: DISCONTINUED | OUTPATIENT
Start: 2024-10-04 | End: 2024-10-06 | Stop reason: HOSPADM

## 2024-10-04 RX ORDER — ACETAMINOPHEN 325 MG/1
650 TABLET ORAL EVERY 6 HOURS PRN
Status: DISCONTINUED | OUTPATIENT
Start: 2024-10-04 | End: 2024-10-06 | Stop reason: HOSPADM

## 2024-10-04 RX ORDER — OXYTOCIN/0.9 % SODIUM CHLORIDE 30/500 ML
250 PLASTIC BAG, INJECTION (ML) INTRAVENOUS CONTINUOUS
Status: ACTIVE | OUTPATIENT
Start: 2024-10-04 | End: 2024-10-04

## 2024-10-04 RX ORDER — ACETAMINOPHEN 325 MG/1
650 TABLET ORAL ONCE AS NEEDED
Status: DISCONTINUED | OUTPATIENT
Start: 2024-10-04 | End: 2024-10-04 | Stop reason: HOSPADM

## 2024-10-04 RX ADMIN — IBUPROFEN 600 MG: 600 TABLET ORAL at 21:45

## 2024-10-04 RX ADMIN — FENTANYL CITRATE 25 MCG: 50 INJECTION, SOLUTION INTRAMUSCULAR; INTRAVENOUS at 08:46

## 2024-10-04 RX ADMIN — PRENATAL VITAMINS-IRON FUMARATE 27 MG IRON-FOLIC ACID 0.8 MG TABLET 1 TABLET: at 17:10

## 2024-10-04 RX ADMIN — Medication 12 ML/HR: at 08:47

## 2024-10-04 RX ADMIN — FAMOTIDINE 20 MG: 10 INJECTION INTRAVENOUS at 02:10

## 2024-10-04 RX ADMIN — SODIUM CHLORIDE, POTASSIUM CHLORIDE, SODIUM LACTATE AND CALCIUM CHLORIDE 1000 ML: 600; 310; 30; 20 INJECTION, SOLUTION INTRAVENOUS at 08:36

## 2024-10-04 RX ADMIN — SODIUM CHLORIDE, POTASSIUM CHLORIDE, SODIUM LACTATE AND CALCIUM CHLORIDE 30 ML/HR: 600; 310; 30; 20 INJECTION, SOLUTION INTRAVENOUS at 03:14

## 2024-10-04 RX ADMIN — FENTANYL CITRATE 25 MCG: 50 INJECTION, SOLUTION INTRAMUSCULAR; INTRAVENOUS at 08:43

## 2024-10-04 RX ADMIN — Medication 1 MILLI-UNITS/MIN: at 04:10

## 2024-10-04 RX ADMIN — ACETAMINOPHEN 650 MG: 325 TABLET, FILM COATED ORAL at 17:10

## 2024-10-04 RX ADMIN — MINERAL OIL 30 ML: 1000 SOLUTION ORAL at 09:56

## 2024-10-04 NOTE — L&D DELIVERY NOTE
" [facility]   Vaginal Delivery Note    Patient Name: Summer Elmore  : 2000  MRN: [id number]    Date of Delivery: 10/4/2024     Diagnosis     Pre & Post-Delivery:  Intrauterine pregnancy at 39w0d  Labor status: Induced Onset of Labor     Pregnancy     (spontaneous vaginal delivery)             Problem List    Transfer to Postpartum     Review the Delivery Report for details.     Delivery     Delivery: Vaginal, Spontaneous     YOB: 2026    Time of Birth:  Gestational Age 10:02 AM   39w0d     Anesthesia: Epidural   Delivering clinician: Nano DENG Foster    Forceps?   No   Vacuum? No    Shoulder dystocia present: No        Delivery narrative:  Summer progressed to C+P and pushed effectively. Mouth and nose bulb suctioned on perineum.  Anterior shoulder was delivered easily with gentle traction and maternal effort followed by posterior shoulder.  viable male. Body cord noted and unwrapped. Infant stimulated, dried, and placed on mom's abdomen. Infant with lusty cry and spontaneous respirations. Delayed cord double clamped; cut by dad. Cord blood obtained. Placenta delivered Schultze intact with 3V cord. Pitocin 20 units IV given.  Perineum, vagina and labia inspected and no lacerations noted. FF @ U, light rubra lochia.       Infant     Findings: male  infant     Infant observations: Weight: 3674 g (8 lb 1.6 oz)   Length: 20.5  in  Observations/Comments:        Apgars: 7  @ 1 minute /    9  @ 5 minutes   Infant Name: Mario Alberto     Placenta & Cord         Placenta delivered  Spontaneous  at   10/4/2024 10:07 AM     Cord: 3 vessels  present.   Nuchal Cord?  no Body cord x 1   Cord blood obtained: Yes    Cord gases obtained:      Cord gas results: Venous:  No results found for: \"PHCVEN\", \"BECVEN\"    Arterial:  No results found for: \"PHCART\", \"BECART\"     Repair     Episiotomy: None     No    Lacerations: No   Estimated Blood Loss: 150     Quantitative Blood Loss:    QBL from VAG DEL: 29 " (10/04/24 1143)     Complications     none    Disposition     Mother to Mother Baby/Postpartum  in stable condition currently.  Baby remains with mom  in stable condition currently.    Nano Santos CNM  10/04/24  12:57 EDT

## 2024-10-04 NOTE — ANESTHESIA PROCEDURE NOTES
CSE Block      Patient reassessed immediately prior to procedure    Reason for block: procedure for pain, at surgeon's request, post-op pain management and labor pain  Staffing  Resident/CRNA: Jens Santos CRNA  Performed by: Jens Santos, ELIOT  Authorized by: Jens Santos CRNA    Preanesthetic Checklist  Completed: patient identified, IV checked, site marked, risks and benefits discussed, surgical consent, monitors and equipment checked, pre-op evaluation and timeout performed  CSE  Patient position: sitting  Patient monitoring: blood pressure monitoring and continuous pulse oximetry  Procedures: landmark technique and palpation technique  Spinal Needle  Needle type: Pencan   Needle gauge: 25 G  Approach: midline  Location: L3-4  Fluid Appearance: clear    Epidural Needle  Injection technique: NILTON saline  Needle type: Tuohy   Needle gauge: 18 G  Location: L3-L4  Level: 10-11  Loss of Resistance: 5cm  Cath Depth at Skin (cm): 9  Aspiration: negative  Test dose: negative      Catheter  Catheter type: end hole  Catheter size: 20 G  Assessment  Dressing:occlusive dressing applied and secured with tape  Pt Tolerance:patient tolerated the procedure well with no apparent complications  Complications:no  Additional Notes  Risks discussed , including but not limited to:  Headache, itching, n&v, infection, failure, decreased blood pressure, permanent chronic/back pain, nerve damage, paralysis, etc. All questions answered and informed consent obtained. Skin localized with lidocaine skin wheel. Test dose _ ml of 1.5% lidocaine with 1:200,000 epi.

## 2024-10-04 NOTE — H&P
JOO Dee  Summer Moore  : 2000  MRN: [id number]  CSN: [id number]    History and Physical    Chief Complaint   Patient presents with    Scheduled Induction      Subjective   Summer Moore is a 24 y.o. year old  with an Estimated Date of Delivery: 10/11/24 currently 39w0d presenting for induction of labor for elective at term per patient request. She came in last night and Pitocin was started early this morning. She is feeling ctxs.    Risks of labor induction including prolongation of labor, increased risks for both  section and operative vaginal birth have been discussed at length.     Prenatal care has been with STAN Dee.  It has been complicated by anemia. First PNV on 24 @ 7 weeks with 14 visits. Weight gain = 19 lbs.     OB History    Para Term  AB Living   3 2 2 0 0 2   SAB IAB Ectopic Molar Multiple Live Births   0 0 0 0 0 2      # Outcome Date GA Lbr Nomi/2nd Weight Sex Type Anes PTL Lv   3 Current            2 Term 22 38w3d / 00:08 3307 g (7 lb 4.7 oz) F Vag-Spont EPI N GURPREET      Name: KITTY MOORE      Apgar1: 8  Apgar5: 9   1 Term 10/12/17 39w0d / 01:23 3175 g (7 lb) F Vag-Spont EPI N GURPREET      Name: KITTY HAJI      Apgar1: 9  Apgar5: 10     Past Medical History:   Diagnosis Date    Kidney stone     I passed a kidney stone on     Pregnancy 10/3/2024    Rh incompatibility     I have ab negative blood     Past Surgical History:   Procedure Laterality Date    CHOLECYSTECTOMY  2014    LAPAROSCOPIC CHOLECYSTECTOMY      TONSILLECTOMY      TONSILLECTOMY AND ADENOIDECTOMY Bilateral     WISDOM TOOTH EXTRACTION         Current Facility-Administered Medications:     acetaminophen (TYLENOL) tablet 650 mg, 650 mg, Oral, Q4H PRN, Laly Damian MD    Famotidine (PF) (PEPCID) injection 20 mg, 20 mg, Intravenous, BID, Dennys Goode MD, 20 mg at 10/04/24 0210    hydrOXYzine (ATARAX) tablet 50 mg, 50 mg, Oral, Nightly PRN, Land,  "Laly PELLETIER MD    lactated ringers bolus 1,000 mL, 1,000 mL, Intravenous, Once, Laly Damian MD    lactated ringers infusion, 30 mL/hr, Intravenous, Continuous, Nano Santos CNM, Last Rate: 30 mL/hr at 10/04/24 0314, 30 mL/hr at 10/04/24 0314    lidocaine (XYLOCAINE) 1 % injection 0.5 mL, 0.5 mL, Intradermal, Once PRN, Laly Damian MD    mineral oil liquid 30 mL, 30 mL, Topical, Once, Nano Santos CNM    ondansetron ODT (ZOFRAN-ODT) disintegrating tablet 4 mg, 4 mg, Oral, Q6H PRN **OR** ondansetron (ZOFRAN) injection 4 mg, 4 mg, Intravenous, Q6H PRN, Laly Damian MD    oxytocin (PITOCIN) 30 units in 0.9% sodium chloride 500 mL (premix), 1-20 mark-units/min, Intravenous, Titrated, Nano Santos CNM, Last Rate: 2 mL/hr at 10/04/24 0635, 2 mark-units/min at 10/04/24 0635    promethazine (PHENERGAN) suppository 12.5 mg, 12.5 mg, Rectal, Q6H PRN **OR** promethazine (PHENERGAN) tablet 12.5 mg, 12.5 mg, Oral, Q6H PRN, Laly Damian MD    sodium chloride 0.9 % flush 10 mL, 10 mL, Intravenous, Q12H, Laly Damian MD    sodium chloride 0.9 % flush 10 mL, 10 mL, Intravenous, PRN, Laly Damian MD    sodium chloride 0.9 % infusion 40 mL, 40 mL, Intravenous, PRN, Laly Damian MD    Allergies   Allergen Reactions    Morphine And Codeine Other (See Comments)     Pt states caused \"burning all over\"      Oxycodone Nausea And Vomiting    Amoxicillin Rash    Dermoplast [Benzocaine-Menthol] Hives     Social History    Tobacco Use      Smoking status: Every Day        Packs/day: 0.50        Years: 0.4 packs/day for 20.0 years (7.5 ttl pk-yrs)        Types: Cigarettes        Passive exposure: Current      Smokeless tobacco: Never      Tobacco comments: 4-5 cig/day    Review of Systems   Constitutional: Negative.    Respiratory: Negative.     Cardiovascular: Negative.    Gastrointestinal: Negative.    Genitourinary: Negative.    Musculoskeletal: Negative.    Neurological: Negative.    Psychiatric/Behavioral: Negative.   " "  All other systems reviewed and are negative.        Objective   /58   Pulse 72   Temp 97.6 °F (36.4 °C)   Resp 16   Ht 162.6 cm (64\")   Wt 76.1 kg (167 lb 11.2 oz)   LMP 12/30/2023   SpO2 100%   Breastfeeding No   BMI 28.79 kg/m²                                           General:  well developed; well nourished  no acute distress  mentation appropriate   Neck:    Heart:   supple and no masses  normal apical impulse  regular rate and rhythm   Lungs: breathing is unlabored   Abdomen: Gravid and nontender  EFW: 7.5#, Growth scan @ 38 wks= 62%, AC 78%. Posterior placenta   FHT's: reassuring, reactive, and category 1   Cervix: was checked (by me): 4 cm / 70 % / -2, AROM clear fluid   Presentation: cephalic   Contractions: irregular every 2-4 minutes - external monitors used Pitocin @ 4 mu   Extremities:   normal appearance with no cyanosis or edema and no calf tenderness   Skin:  Skin color, texture, turgor normal. No rashes or lesions or Skin warm and dry   Psych:  Normal. and Alert and oriented, appropriate affect.       Prenatal Labs  Lab Results   Component Value Date    HGB 11.2 (L) 10/03/2024    HEPBSAG Negative 02/27/2024    ABSCRN Positive 10/03/2024    DNL2VWE7 Non Reactive 02/27/2024    HEPCVIRUSABY Non Reactive 02/27/2024    STREPGPB Negative 09/12/2024    URINECX No growth 09/28/2024       Current Labs Reviewed   Lab Results (last 24 hours)       Procedure Component Value Units Date/Time    CBC & Differential [[id number]]  (Abnormal) Collected: 10/03/24 3603    Specimen: Blood Updated: 10/04/24 0011    Narrative:      The following orders were created for panel order CBC & Differential.  Procedure                               Abnormality         Status                     ---------                               -----------         ------                     CBC Auto Differential[[id number]]        Abnormal            Final result                 Please view results for these tests on the " individual orders.    CBC Auto Differential [[id number]]  (Abnormal) Collected: 10/03/24 2353    Specimen: Blood Updated: 10/04/24 0011     WBC 8.43 10*3/mm3      RBC 3.45 10*6/mm3      Hemoglobin 11.2 g/dL      Hematocrit 32.9 %      MCV 95.4 fL      MCH 32.5 pg      MCHC 34.0 g/dL      RDW 13.5 %      RDW-SD 46.4 fl      MPV 12.5 fL      Platelets 154 10*3/mm3      Neutrophil % 72.2 %      Lymphocyte % 16.7 %      Monocyte % 7.5 %      Eosinophil % 2.7 %      Basophil % 0.4 %      Immature Grans % 0.5 %      Neutrophils, Absolute 6.09 10*3/mm3      Lymphocytes, Absolute 1.41 10*3/mm3      Monocytes, Absolute 0.63 10*3/mm3      Eosinophils, Absolute 0.23 10*3/mm3      Basophils, Absolute 0.03 10*3/mm3      Immature Grans, Absolute 0.04 10*3/mm3      nRBC 0.0 /100 WBC     Treponema pallidum AB w/Reflex RPR [[id number]] Collected: 10/03/24 2353    Specimen: Blood Updated: 10/04/24 0008    POC Urinalysis Dipstick [[id number]]  (Abnormal) Collected: 10/03/24 2257    Specimen: Urine Updated: 10/03/24 2257     Color Yellow     Clarity, UA Cloudy     Glucose, UA Negative mg/dL      Bilirubin Negative     Ketones, UA Negative     Specific Gravity  1.010     Blood, UA Negative     pH, Urine 7.0     Protein, POC Trace mg/dL      Urobilinogen, UA Normal     Leukocytes Negative     Nitrite, UA Negative               ASSESSMENT  IUP at 39w0d  Induction of labor because of elective at term per patient request  Group B strep status: negative  Reactive NST    PLAN  Admit to   Low dose Pitocin induction and increase per protocol @ 0500  All procedures explained to patient and partner. Questions answered and verbalized understanding.  Epidural if desires  Anticipate     Nano Santos, APRN  10/4/2024  07:35 EDT

## 2024-10-04 NOTE — PAYOR COMM NOTE
"TO:[facility]  FROM:ROZ WYNNE, RN PHONE [phone]  CLINICALS    Summer Moore (24 y.o. Female)       Date of Birth   2000    Social Security Number       Address   [address] [place] [address]    Home Phone   [phone]    MRN   [id number]       Denominational   None    Marital Status   Single                            Admission Date   10/3/24    Admission Type   Elective    Admitting Provider   Dennys Goode MD    Attending Provider   Nano Santos CNM    Department, Room/Bed   [facility], L3/1       Discharge Date       Discharge Disposition       Discharge Destination                                 Attending Provider: Nano Santos CNM    Allergies: Morphine And Codeine, Oxycodone, Amoxicillin, Dermoplast [Benzocaine-menthol]    Isolation: None   Infection: None   Code Status: CPR    Ht: 162.6 cm (64\")   Wt: 76.1 kg (167 lb 11.2 oz)    Admission Cmt: None   Principal Problem: None                  Active Insurance as of 10/3/2024       Primary Coverage       Payor Plan Insurance Group Employer/Plan Group    [employer] [place] [employer]        Payor Plan Address Payor Plan Phone Number Payor Plan Fax Number Effective Dates    [address] [address] [phone]  2017 - None Entered    [facility] [id number]         Subscriber Name Subscriber Birth Date Member ID       SUMMER MOORE 2000 [id number]                     Emergency Contacts        (Rel.) Home Phone Work Phone Mobile Phone    Philly Caal (Mother) [phone] -- --                 History & Physical        Nano Santos CNM at 10/04/24 0735       Attestation signed by Dennys Goode MD at 10/04/24 0856    I have reviewed this documentation and agree.                   Luiz Moore  : 2000  MRN: [id number]  CSN: [id number]    History and Physical    Chief Complaint   Patient presents with    Scheduled Induction    "   Lang Moore is a 24 y.o. year old  with an Estimated Date of Delivery: 10/11/24 currently 39w0d presenting for induction of labor for elective at term per patient request. She came in last night and Pitocin was started early this morning. She is feeling ctxs.    Risks of labor induction including prolongation of labor, increased risks for both  section and operative vaginal birth have been discussed at length.     Prenatal care has been with [facility].  It has been complicated by anemia. First PNV on 24 @ 7 weeks with 14 visits. Weight gain = 19 lbs.     OB History    Para Term  AB Living   3 2 2 0 0 2   SAB IAB Ectopic Molar Multiple Live Births   0 0 0 0 0 2      # Outcome Date GA Lbr Nomi/2nd Weight Sex Type Anes PTL Lv   3 Current            2 Term 22 38w3d / 00:08 3307 g (7 lb 4.7 oz) F Vag-Spont EPI N GURPREET      Name: KITTY MOORE      Apgar1: 8  Apgar5: 9   1 Term 10/12/17 39w0d / 01:23 3175 g (7 lb) F Vag-Spont EPI N GURPREET      Name: VINCENT HAJIRL      Apgar1: 9  Apgar5: 10     Past Medical History:   Diagnosis Date    Kidney stone     I passed a kidney stone on     Pregnancy 10/3/2024    Rh incompatibility     I have ab negative blood     Past Surgical History:   Procedure Laterality Date    CHOLECYSTECTOMY      LAPAROSCOPIC CHOLECYSTECTOMY      TONSILLECTOMY      TONSILLECTOMY AND ADENOIDECTOMY Bilateral     WISDOM TOOTH EXTRACTION  2015       Current Facility-Administered Medications:     acetaminophen (TYLENOL) tablet 650 mg, 650 mg, Oral, Q4H PRN, Laly Damian MD    Famotidine (PF) (PEPCID) injection 20 mg, 20 mg, Intravenous, BID, Dennys Goode MD, 20 mg at 10/04/24 0210    hydrOXYzine (ATARAX) tablet 50 mg, 50 mg, Oral, Nightly PRN, Laly Damian MD    lactated ringers bolus 1,000 mL, 1,000 mL, Intravenous, Once, Laly Damian MD    lactated ringers infusion, 30 mL/hr, Intravenous, Continuous, Foster,  "Nano DENG CNM, Last Rate: 30 mL/hr at 10/04/24 0314, 30 mL/hr at 10/04/24 0314    lidocaine (XYLOCAINE) 1 % injection 0.5 mL, 0.5 mL, Intradermal, Once PRN, Laly Damian MD    mineral oil liquid 30 mL, 30 mL, Topical, Once, Nano Santos CNM    ondansetron ODT (ZOFRAN-ODT) disintegrating tablet 4 mg, 4 mg, Oral, Q6H PRN **OR** ondansetron (ZOFRAN) injection 4 mg, 4 mg, Intravenous, Q6H PRN, Laly Damian MD    oxytocin (PITOCIN) 30 units in 0.9% sodium chloride 500 mL (premix), 1-20 mark-units/min, Intravenous, Titrated, Nano Santos CNM, Last Rate: 2 mL/hr at 10/04/24 0635, 2 mark-units/min at 10/04/24 0635    promethazine (PHENERGAN) suppository 12.5 mg, 12.5 mg, Rectal, Q6H PRN **OR** promethazine (PHENERGAN) tablet 12.5 mg, 12.5 mg, Oral, Q6H PRN, Laly Damian MD    sodium chloride 0.9 % flush 10 mL, 10 mL, Intravenous, Q12H, Laly Damian MD    sodium chloride 0.9 % flush 10 mL, 10 mL, Intravenous, PRN, Laly Damian MD    sodium chloride 0.9 % infusion 40 mL, 40 mL, Intravenous, PRN, Laly Damian MD    Allergies   Allergen Reactions    Morphine And Codeine Other (See Comments)     Pt states caused \"burning all over\"      Oxycodone Nausea And Vomiting    Amoxicillin Rash    Dermoplast [Benzocaine-Menthol] Hives     Social History    Tobacco Use      Smoking status: Every Day        Packs/day: 0.50        Years: 0.4 packs/day for 20.0 years (7.5 ttl pk-yrs)        Types: Cigarettes        Passive exposure: Current      Smokeless tobacco: Never      Tobacco comments: 4-5 cig/day    Review of Systems   Constitutional: Negative.    Respiratory: Negative.     Cardiovascular: Negative.    Gastrointestinal: Negative.    Genitourinary: Negative.    Musculoskeletal: Negative.    Neurological: Negative.    Psychiatric/Behavioral: Negative.     All other systems reviewed and are negative.        Objective  /58   Pulse 72   Temp 97.6 °F (36.4 °C)   Resp 16   Ht 162.6 cm (64\")   Wt 76.1 kg (167 " lb 11.2 oz)   LMP 12/30/2023   SpO2 100%   Breastfeeding No   BMI 28.79 kg/m²                                           General:  well developed; well nourished  no acute distress  mentation appropriate   Neck:    Heart:   supple and no masses  normal apical impulse  regular rate and rhythm   Lungs: breathing is unlabored   Abdomen: Gravid and nontender  EFW: 7.5#, Growth scan @ 38 wks= 62%, AC 78%. Posterior placenta   FHT's: reassuring, reactive, and category 1   Cervix: was checked (by me): 4 cm / 70 % / -2, AROM clear fluid   Presentation: cephalic   Contractions: irregular every 2-4 minutes - external monitors used Pitocin @ 4 mu   Extremities:   normal appearance with no cyanosis or edema and no calf tenderness   Skin:  Skin color, texture, turgor normal. No rashes or lesions or Skin warm and dry   Psych:  Normal. and Alert and oriented, appropriate affect.       Prenatal Labs  Lab Results   Component Value Date    HGB 11.2 (L) 10/03/2024    HEPBSAG Negative 02/27/2024    ABSCRN Positive 10/03/2024    OAW1KUQ0 Non Reactive 02/27/2024    HEPCVIRUSABY Non Reactive 02/27/2024    STREPGPB Negative 09/12/2024    URINECX No growth 09/28/2024       Current Labs Reviewed   Lab Results (last 24 hours)       Procedure Component Value Units Date/Time    CBC & Differential [[id number]]  (Abnormal) Collected: 10/03/24 2353    Specimen: Blood Updated: 10/04/24 0011    Narrative:      The following orders were created for panel order CBC & Differential.  Procedure                               Abnormality         Status                     ---------                               -----------         ------                     CBC Auto Differential[[id number]]        Abnormal            Final result                 Please view results for these tests on the individual orders.    CBC Auto Differential [[id number]]  (Abnormal) Collected: 10/03/24 2353    Specimen: Blood Updated: 10/04/24 0011     WBC 8.43 10*3/mm3      RBC 3.45  10*6/mm3      Hemoglobin 11.2 g/dL      Hematocrit 32.9 %      MCV 95.4 fL      MCH 32.5 pg      MCHC 34.0 g/dL      RDW 13.5 %      RDW-SD 46.4 fl      MPV 12.5 fL      Platelets 154 10*3/mm3      Neutrophil % 72.2 %      Lymphocyte % 16.7 %      Monocyte % 7.5 %      Eosinophil % 2.7 %      Basophil % 0.4 %      Immature Grans % 0.5 %      Neutrophils, Absolute 6.09 10*3/mm3      Lymphocytes, Absolute 1.41 10*3/mm3      Monocytes, Absolute 0.63 10*3/mm3      Eosinophils, Absolute 0.23 10*3/mm3      Basophils, Absolute 0.03 10*3/mm3      Immature Grans, Absolute 0.04 10*3/mm3      nRBC 0.0 /100 WBC     Treponema pallidum AB w/Reflex RPR [[id number]] Collected: 10/03/24 2353    Specimen: Blood Updated: 10/04/24 0008    POC Urinalysis Dipstick [[id number]]  (Abnormal) Collected: 10/03/24 2257    Specimen: Urine Updated: 10/03/24 2257     Color Yellow     Clarity, UA Cloudy     Glucose, UA Negative mg/dL      Bilirubin Negative     Ketones, UA Negative     Specific Gravity  1.010     Blood, UA Negative     pH, Urine 7.0     Protein, POC Trace mg/dL      Urobilinogen, UA Normal     Leukocytes Negative     Nitrite, UA Negative               ASSESSMENT  IUP at 39w0d  Induction of labor because of elective at term per patient request  Group B strep status: negative  Reactive NST    PLAN  Admit to   Low dose Pitocin induction and increase per protocol @ 0500  All procedures explained to patient and partner. Questions answered and verbalized understanding.  Epidural if desires  Anticipate     Nano Santos, APRN  10/4/2024  07:35 EDT         Electronically signed by Dennys Goode MD at 10/04/24 0856       Vital Signs (last day)       Date/Time Temp Temp src Pulse Resp BP Patient Position SpO2    10/04/24 1045 -- -- 75 -- 98/70 -- --    10/04/24 1030 -- -- 75 -- 115/62 -- --    10/04/24 1015 97.7 (36.5) Oral -- 18 -- -- --    10/04/24 1002 -- -- 61 --  -- --    10/04/24 1000 -- -- 61 --  -- --     10/04/24 0946 -- -- 126 -- 80/64 -- --    10/04/24 0945 -- -- 87 -- -- -- 100    10/04/24 0931 -- -- 75 -- 97/69 -- --    10/04/24 0930 -- -- 58 -- -- -- 99    10/04/24 0920 -- -- 69 -- -- -- 99    10/04/24 0917 -- -- 61 -- 107/62 -- --    10/04/24 0916 -- -- 61 -- 107/62 -- --    10/04/24 0915 97.8 (36.6) Temporal 61 18 -- -- 100    10/04/24 0910 -- -- 67 -- -- -- 99    10/04/24 0905 -- -- 71 -- -- -- 100    10/04/24 0900 -- -- 68 -- 108/61 -- --    10/04/24 0855 -- -- 69 -- 107/58 -- --    10/04/24 0852 -- -- 93 -- 105/61 -- --    10/04/24 0850 -- -- 90 -- 149/126 -- 99    10/04/24 0849 -- -- 61 -- 149/126 -- --    10/04/24 0846 -- -- 81 -- 112/70 -- --    10/04/24 0845 -- -- 77 -- -- -- 98    10/04/24 0843 -- -- 82 -- 113/69 -- --    10/04/24 0840 -- -- 89 -- 117/78 -- 97    10/04/24 0838 -- -- 89 -- 115/68 -- --    10/04/24 0830 -- -- 71 -- 114/74 -- 100    10/04/24 0825 -- -- 74 -- -- -- 100    10/04/24 0816 -- -- 85 -- 116/61 -- --    10/04/24 0815 -- -- 76 -- -- -- 100    10/04/24 0801 -- -- 73 -- 102/73 -- --    10/04/24 0800 -- -- 75 -- -- -- 100    10/04/24 0746 -- -- 90 -- 115/66 -- --    10/04/24 0745 -- -- 63 -- -- -- 100    10/04/24 0735 -- -- 84 -- -- -- 100    10/04/24 0731 -- -- 77 -- 109/67 -- --    10/04/24 0724 97.6 (36.4) -- 72 -- 114/58 -- --    10/04/24 0700 -- -- 62 -- 111/74 -- 100    10/04/24 0646 -- -- 73 16 106/75 Lying --    10/04/24 0645 -- -- 77 -- -- -- 100    10/04/24 0635 -- -- 76 -- -- -- 100    10/04/24 0631 -- -- 62 -- 110/80 -- --    10/04/24 0630 -- -- 98 -- -- -- 99    10/04/24 0616 -- -- 66 -- 95/51 -- --    10/04/24 0615 -- -- 66 -- 95/51 -- --    10/04/24 0600 -- -- 62 -- 103/52 -- --    10/04/24 0546 -- -- 83 -- 102/60 -- --    10/04/24 0540 -- -- 73 -- -- -- 99    10/04/24 0531 -- -- 58 -- 101/66 -- --    10/04/24 0530 -- -- 61 -- -- -- 99    10/04/24 0516 -- -- 77 -- 111/70 -- --    10/04/24 0515 -- -- 55 -- -- -- 99    10/04/24 0500 -- -- 55 -- 100/61 -- 98     10/04/24 0455 -- -- 58 -- -- -- 99    10/04/24 0445 -- -- 69 -- 102/59 -- 99    10/04/24 0435 -- -- 71 -- -- -- 99    10/04/24 0432 -- -- 60 -- -- -- --    10/04/24 0430 -- -- 89 -- 115/74 -- 99    10/04/24 0415 -- -- 70 -- 102/63 -- 99    10/04/24 0410 -- -- 82 -- -- -- 99    10/04/24 0400 -- -- 87 -- 110/77 -- 99    10/04/24 0350 -- -- 78 -- -- -- 99    10/04/24 0345 -- -- 82 -- 105/56 -- 100    10/04/24 0335 -- -- 83 -- -- -- 98    10/04/24 0330 -- -- 80 -- 101/55 -- 99    10/04/24 0315 -- -- 85 -- 105/73 -- --    10/04/24 0300 -- -- 82 -- 103/67 -- --    10/04/24 0245 -- -- 81 -- 98/62 -- 99    10/04/24 0230 -- -- 66 -- 104/67 -- 100    10/04/24 0215 -- -- 57 -- 114/73 -- 99    10/04/24 0200 -- -- 80 -- 106/52 -- 99    10/04/24 0145 -- -- 72 -- 114/66 -- 100    10/04/24 0130 -- -- 66 -- 115/84 -- 99    10/04/24 0115 -- -- 72 -- 114/74 -- 99    10/04/24 0100 -- -- 72 -- 113/81 -- 99    10/04/24 0045 -- -- 68 -- 99/76 -- 98    10/04/24 0030 -- -- 73 -- 110/79 -- 100    10/04/24 0015 -- -- 72 -- 112/68 -- 98    10/04/24 0000 -- -- 65 -- 117/76 -- 97    10/03/24 2345 98.2 (36.8) Temporal 63 16 112/58 Lying 98    10/03/24 2100 -- -- 93 -- 116/77 -- 99    10/03/24 2005 -- -- 102 -- 118/81 -- --    10/03/24 2003 98.7 (37.1) Temporal 108 17 118/81 Lying 99          Physician Progress Notes (last 48 hours)  Notes from 10/02/24 1150 through 10/04/24 1150   No notes of this type exist for this encounter.

## 2024-10-04 NOTE — ANESTHESIA PREPROCEDURE EVALUATION
Anesthesia Evaluation     Patient summary reviewed, Nursing notes reviewed and pregnancy test completed   no history of anesthetic complications:   NPO Solid Status: > 8 hours  NPO Liquid Status: > 8 hours           Airway   Mallampati: I  TM distance: >3 FB  Neck ROM: full  no difficulty expected and Possible difficult intubation  Dental - normal exam     Pulmonary - normal exam   (+) a smoker Current,  Cardiovascular - normal exam    Rhythm: regular  Rate: normal        Neuro/Psych  GI/Hepatic/Renal/Endo    (+) renal disease-    Musculoskeletal     Abdominal  - normal exam    Abdomen: soft.  Bowel sounds: normal.   Substance History      OB/GYN    (+) Pregnant        Other                    Anesthesia Plan    ASA 2     epidural     (Risks discussed , including but not limited to:  Headache, itching, n&v, infection, failure, decreased blood pressure, permanent chronic/back pain, nerve damage, paralysis, etc. All questions answered and informed consent obtained.  )    Anesthetic plan, risks, benefits, and alternatives have been provided, discussed and informed consent has been obtained with: patient.  Pre-procedure education provided    CODE STATUS:    Code Status (Patient has no pulse and is not breathing): CPR (Attempt to Resuscitate)  Medical Interventions (Patient has pulse or is breathing): Full

## 2024-10-04 NOTE — NON STRESS TEST
Summer Elmore, a  at 38w6d with an PRIYA of 10/11/2024, by Ultrasound, was seen at [facility] for a nonstress test.    Chief Complaint   Patient presents with    Scheduled Induction       Patient Active Problem List   Diagnosis    Rh negative status during pregnancy in third trimester    Request for sterilization    Pregnancy       Start Time:   Stop Time:     Interpretation A  Nonstress Test Interpretation A: Reactive

## 2024-10-05 ENCOUNTER — ANESTHESIA (OUTPATIENT)
Dept: PERIOP | Facility: HOSPITAL | Age: 24
End: 2024-10-05
Payer: COMMERCIAL

## 2024-10-05 ENCOUNTER — ANESTHESIA EVENT (OUTPATIENT)
Dept: PERIOP | Facility: HOSPITAL | Age: 24
End: 2024-10-05
Payer: COMMERCIAL

## 2024-10-05 LAB
ABO GROUP BLD: NORMAL
BASOPHILS # BLD AUTO: 0.03 10*3/MM3 (ref 0–0.2)
BASOPHILS NFR BLD AUTO: 0.4 % (ref 0–1.5)
DEPRECATED RDW RBC AUTO: 46.9 FL (ref 37–54)
EOSINOPHIL # BLD AUTO: 0.21 10*3/MM3 (ref 0–0.4)
EOSINOPHIL NFR BLD AUTO: 2.7 % (ref 0.3–6.2)
ERYTHROCYTE [DISTWIDTH] IN BLOOD BY AUTOMATED COUNT: 13.2 % (ref 12.3–15.4)
FETAL BLEED: NEGATIVE
HCT VFR BLD AUTO: 30.8 % (ref 34–46.6)
HGB BLD-MCNC: 10.4 G/DL (ref 12–15.9)
IMM GRANULOCYTES # BLD AUTO: 0.03 10*3/MM3 (ref 0–0.05)
IMM GRANULOCYTES NFR BLD AUTO: 0.4 % (ref 0–0.5)
LYMPHOCYTES # BLD AUTO: 1.38 10*3/MM3 (ref 0.7–3.1)
LYMPHOCYTES NFR BLD AUTO: 17.5 % (ref 19.6–45.3)
MCH RBC QN AUTO: 32.6 PG (ref 26.6–33)
MCHC RBC AUTO-ENTMCNC: 33.8 G/DL (ref 31.5–35.7)
MCV RBC AUTO: 96.6 FL (ref 79–97)
MONOCYTES # BLD AUTO: 0.54 10*3/MM3 (ref 0.1–0.9)
MONOCYTES NFR BLD AUTO: 6.8 % (ref 5–12)
NEUTROPHILS NFR BLD AUTO: 5.71 10*3/MM3 (ref 1.7–7)
NEUTROPHILS NFR BLD AUTO: 72.2 % (ref 42.7–76)
NRBC BLD AUTO-RTO: 0 /100 WBC (ref 0–0.2)
NUMBER OF DOSES: NORMAL
PLATELET # BLD AUTO: 117 10*3/MM3 (ref 140–450)
PMV BLD AUTO: 12.7 FL (ref 6–12)
RBC # BLD AUTO: 3.19 10*6/MM3 (ref 3.77–5.28)
RH BLD: NEGATIVE
WBC NRBC COR # BLD AUTO: 7.9 10*3/MM3 (ref 3.4–10.8)

## 2024-10-05 PROCEDURE — 25010000002 HYDROMORPHONE 1 MG/ML SOLUTION: Performed by: NURSE ANESTHETIST, CERTIFIED REGISTERED

## 2024-10-05 PROCEDURE — 0UB70ZZ EXCISION OF BILATERAL FALLOPIAN TUBES, OPEN APPROACH: ICD-10-PCS | Performed by: OBSTETRICS & GYNECOLOGY

## 2024-10-05 PROCEDURE — 25010000002 MIDAZOLAM PER 1MG: Performed by: NURSE ANESTHETIST, CERTIFIED REGISTERED

## 2024-10-05 PROCEDURE — 25010000002 LIDOCAINE (CARDIAC): Performed by: NURSE ANESTHETIST, CERTIFIED REGISTERED

## 2024-10-05 PROCEDURE — 25010000002 KETOROLAC TROMETHAMINE PER 15 MG: Performed by: NURSE ANESTHETIST, CERTIFIED REGISTERED

## 2024-10-05 PROCEDURE — 25010000002 SUGAMMADEX 200 MG/2ML SOLUTION: Performed by: NURSE ANESTHETIST, CERTIFIED REGISTERED

## 2024-10-05 PROCEDURE — 25810000003 SODIUM CHLORIDE 0.9 % SOLUTION: Performed by: NURSE ANESTHETIST, CERTIFIED REGISTERED

## 2024-10-05 PROCEDURE — 85025 COMPLETE CBC W/AUTO DIFF WBC: CPT | Performed by: MIDWIFE

## 2024-10-05 PROCEDURE — 58605 DIVISION OF FALLOPIAN TUBE: CPT | Performed by: OBSTETRICS & GYNECOLOGY

## 2024-10-05 PROCEDURE — 25010000002 DEXAMETHASONE PER 1 MG: Performed by: NURSE ANESTHETIST, CERTIFIED REGISTERED

## 2024-10-05 PROCEDURE — 25010000002 GLYCOPYRROLATE PF 0.4 MG/2ML SOLUTION: Performed by: NURSE ANESTHETIST, CERTIFIED REGISTERED

## 2024-10-05 PROCEDURE — 85461 HEMOGLOBIN FETAL: CPT | Performed by: MIDWIFE

## 2024-10-05 PROCEDURE — 88302 TISSUE EXAM BY PATHOLOGIST: CPT

## 2024-10-05 PROCEDURE — 25010000002 FENTANYL CITRATE (PF) 50 MCG/ML SOLUTION PREFILLED SYRINGE: Performed by: NURSE ANESTHETIST, CERTIFIED REGISTERED

## 2024-10-05 PROCEDURE — 86901 BLOOD TYPING SEROLOGIC RH(D): CPT | Performed by: MIDWIFE

## 2024-10-05 PROCEDURE — 25010000002 ONDANSETRON PER 1 MG: Performed by: NURSE ANESTHETIST, CERTIFIED REGISTERED

## 2024-10-05 PROCEDURE — 25010000002 PROPOFOL 10 MG/ML EMULSION: Performed by: NURSE ANESTHETIST, CERTIFIED REGISTERED

## 2024-10-05 PROCEDURE — 86900 BLOOD TYPING SEROLOGIC ABO: CPT | Performed by: MIDWIFE

## 2024-10-05 PROCEDURE — 25010000002 RHO D IMMUNE GLOBULIN 1500 UNIT/2ML SOLUTION PREFILLED SYRINGE: Performed by: MIDWIFE

## 2024-10-05 PROCEDURE — 25010000002 METOCLOPRAMIDE PER 10 MG: Performed by: NURSE ANESTHETIST, CERTIFIED REGISTERED

## 2024-10-05 RX ORDER — FENTANYL CITRATE 50 UG/ML
INJECTION, SOLUTION INTRAMUSCULAR; INTRAVENOUS AS NEEDED
Status: DISCONTINUED | OUTPATIENT
Start: 2024-10-05 | End: 2024-10-05 | Stop reason: SURG

## 2024-10-05 RX ORDER — ROCURONIUM BROMIDE 50 MG/5 ML
SYRINGE (ML) INTRAVENOUS AS NEEDED
Status: DISCONTINUED | OUTPATIENT
Start: 2024-10-05 | End: 2024-10-05 | Stop reason: SURG

## 2024-10-05 RX ORDER — METOCLOPRAMIDE HYDROCHLORIDE 5 MG/ML
INJECTION INTRAMUSCULAR; INTRAVENOUS AS NEEDED
Status: DISCONTINUED | OUTPATIENT
Start: 2024-10-05 | End: 2024-10-05 | Stop reason: SURG

## 2024-10-05 RX ORDER — KETOROLAC TROMETHAMINE 30 MG/ML
INJECTION, SOLUTION INTRAMUSCULAR; INTRAVENOUS AS NEEDED
Status: DISCONTINUED | OUTPATIENT
Start: 2024-10-05 | End: 2024-10-05 | Stop reason: SURG

## 2024-10-05 RX ORDER — SODIUM CHLORIDE 9 MG/ML
INJECTION, SOLUTION INTRAVENOUS CONTINUOUS PRN
Status: DISCONTINUED | OUTPATIENT
Start: 2024-10-05 | End: 2024-10-05 | Stop reason: SURG

## 2024-10-05 RX ORDER — DEXAMETHASONE SODIUM PHOSPHATE 4 MG/ML
INJECTION, SOLUTION INTRA-ARTICULAR; INTRALESIONAL; INTRAMUSCULAR; INTRAVENOUS; SOFT TISSUE AS NEEDED
Status: DISCONTINUED | OUTPATIENT
Start: 2024-10-05 | End: 2024-10-05 | Stop reason: SURG

## 2024-10-05 RX ORDER — PROPOFOL 10 MG/ML
VIAL (ML) INTRAVENOUS AS NEEDED
Status: DISCONTINUED | OUTPATIENT
Start: 2024-10-05 | End: 2024-10-05 | Stop reason: SURG

## 2024-10-05 RX ORDER — MEPERIDINE HYDROCHLORIDE 25 MG/ML
25 INJECTION INTRAMUSCULAR; INTRAVENOUS; SUBCUTANEOUS
Status: DISCONTINUED | OUTPATIENT
Start: 2024-10-05 | End: 2024-10-05 | Stop reason: HOSPADM

## 2024-10-05 RX ORDER — LORAZEPAM 2 MG/ML
1 INJECTION INTRAMUSCULAR EVERY 4 HOURS PRN
Status: DISCONTINUED | OUTPATIENT
Start: 2024-10-05 | End: 2024-10-05 | Stop reason: HOSPADM

## 2024-10-05 RX ORDER — FENTANYL CITRATE 50 UG/ML
25 INJECTION, SOLUTION INTRAMUSCULAR; INTRAVENOUS
Status: DISCONTINUED | OUTPATIENT
Start: 2024-10-05 | End: 2024-10-05 | Stop reason: HOSPADM

## 2024-10-05 RX ORDER — ONDANSETRON 2 MG/ML
4 INJECTION INTRAMUSCULAR; INTRAVENOUS ONCE AS NEEDED
Status: DISCONTINUED | OUTPATIENT
Start: 2024-10-05 | End: 2024-10-05 | Stop reason: HOSPADM

## 2024-10-05 RX ORDER — MIDAZOLAM HYDROCHLORIDE 2 MG/2ML
INJECTION, SOLUTION INTRAMUSCULAR; INTRAVENOUS AS NEEDED
Status: DISCONTINUED | OUTPATIENT
Start: 2024-10-05 | End: 2024-10-05 | Stop reason: SURG

## 2024-10-05 RX ORDER — KETAMINE HCL IN NACL, ISO-OSM 100MG/10ML
SYRINGE (ML) INJECTION AS NEEDED
Status: DISCONTINUED | OUTPATIENT
Start: 2024-10-05 | End: 2024-10-05 | Stop reason: SURG

## 2024-10-05 RX ORDER — IPRATROPIUM BROMIDE AND ALBUTEROL SULFATE 2.5; .5 MG/3ML; MG/3ML
3 SOLUTION RESPIRATORY (INHALATION) ONCE
Status: DISCONTINUED | OUTPATIENT
Start: 2024-10-05 | End: 2024-10-05 | Stop reason: HOSPADM

## 2024-10-05 RX ORDER — ONDANSETRON 2 MG/ML
INJECTION INTRAMUSCULAR; INTRAVENOUS AS NEEDED
Status: DISCONTINUED | OUTPATIENT
Start: 2024-10-05 | End: 2024-10-05 | Stop reason: SURG

## 2024-10-05 RX ORDER — MIDAZOLAM HYDROCHLORIDE 2 MG/2ML
2 INJECTION, SOLUTION INTRAMUSCULAR; INTRAVENOUS ONCE
Status: DISCONTINUED | OUTPATIENT
Start: 2024-10-05 | End: 2024-10-05 | Stop reason: HOSPADM

## 2024-10-05 RX ADMIN — ACETAMINOPHEN 650 MG: 325 TABLET, FILM COATED ORAL at 10:47

## 2024-10-05 RX ADMIN — HYDROMORPHONE HYDROCHLORIDE 0.5 MG: 1 INJECTION, SOLUTION INTRAMUSCULAR; INTRAVENOUS; SUBCUTANEOUS at 10:01

## 2024-10-05 RX ADMIN — MIDAZOLAM HYDROCHLORIDE 2 MG: 1 INJECTION, SOLUTION INTRAMUSCULAR; INTRAVENOUS at 08:13

## 2024-10-05 RX ADMIN — IBUPROFEN 600 MG: 600 TABLET ORAL at 13:21

## 2024-10-05 RX ADMIN — SUGAMMADEX 200 MG: 100 INJECTION, SOLUTION INTRAVENOUS at 09:00

## 2024-10-05 RX ADMIN — METOCLOPRAMIDE 10 MG: 5 INJECTION, SOLUTION INTRAMUSCULAR; INTRAVENOUS at 08:21

## 2024-10-05 RX ADMIN — FENTANYL CITRATE 100 MCG: 50 INJECTION, SOLUTION INTRAMUSCULAR; INTRAVENOUS at 08:21

## 2024-10-05 RX ADMIN — Medication 50 MG: at 08:21

## 2024-10-05 RX ADMIN — LIDOCAINE HYDROCHLORIDE 100 MG: 20 INJECTION, SOLUTION INTRAVENOUS at 08:21

## 2024-10-05 RX ADMIN — GLYCOPYRROLATE 0.4 MCG: 0.2 INJECTION, SOLUTION INTRAMUSCULAR; INTRAVENOUS at 08:21

## 2024-10-05 RX ADMIN — HUMAN RHO(D) IMMUNE GLOBULIN 1500 UNITS: 1500 SOLUTION INTRAMUSCULAR; INTRAVENOUS at 16:18

## 2024-10-05 RX ADMIN — SODIUM CHLORIDE: 9 INJECTION, SOLUTION INTRAVENOUS at 08:15

## 2024-10-05 RX ADMIN — ACETAMINOPHEN 650 MG: 325 TABLET, FILM COATED ORAL at 17:42

## 2024-10-05 RX ADMIN — KETOROLAC TROMETHAMINE 30 MG: 30 INJECTION, SOLUTION INTRAMUSCULAR; INTRAVENOUS at 08:21

## 2024-10-05 RX ADMIN — IBUPROFEN 600 MG: 600 TABLET ORAL at 22:08

## 2024-10-05 RX ADMIN — PROPOFOL 200 MG: 10 INJECTION, EMULSION INTRAVENOUS at 08:21

## 2024-10-05 RX ADMIN — ONDANSETRON 4 MG: 2 INJECTION INTRAMUSCULAR; INTRAVENOUS at 08:21

## 2024-10-05 RX ADMIN — DEXAMETHASONE SODIUM PHOSPHATE 8 MG: 4 INJECTION, SOLUTION INTRA-ARTICULAR; INTRALESIONAL; INTRAMUSCULAR; INTRAVENOUS; SOFT TISSUE at 08:21

## 2024-10-05 NOTE — ANESTHESIA PREPROCEDURE EVALUATION
Anesthesia Evaluation     Patient summary reviewed, Nursing notes reviewed and pregnancy test completed   no history of anesthetic complications:   NPO Solid Status: > 8 hours  NPO Liquid Status: > 8 hours           Airway   Mallampati: I  TM distance: >3 FB  Neck ROM: full  no difficulty expected and Possible difficult intubation  Dental - normal exam     Pulmonary - normal exam   (+) a smoker Current, cigarettes,  Cardiovascular - normal exam        Neuro/Psych  GI/Hepatic/Renal/Endo    (+) renal disease-    Musculoskeletal     Abdominal  - normal exam    Abdomen: soft.  Bowel sounds: normal.   Substance History      OB/GYN          Other                    Anesthesia Plan    ASA 2     general     (Risks and benefits discussed including risk of aspiration, recall and dental damage. All patient questions answered.    Patient told that a breathing tube will be used to manage the airway.    Will continue with plan of care.)  intravenous induction     Anesthetic plan, risks, benefits, and alternatives have been provided, discussed and informed consent has been obtained with: patient.  Pre-procedure education provided      CODE STATUS:    Level Of Support Discussed With: Patient  Code Status (Patient has no pulse and is not breathing): CPR (Attempt to Resuscitate)  Medical Interventions (Patient has pulse or is breathing): Full Support

## 2024-10-05 NOTE — PLAN OF CARE
Goal Outcome Evaluation:      Pt VSS, pt has hx of CHTN with blood pressures continuing to be monitored PP. Pt has been bottle feeding and bonding well with her baby. Pt has a PPTL today and has been managing pain with tylenol and ibuprofen. Pt requests to only take tylenol and ibuprofen. Pt states her pain is 2/10 when not moving but increases with movement.

## 2024-10-05 NOTE — ANESTHESIA PROCEDURE NOTES
Airway  Urgency: elective    Date/Time: 10/5/2024 8:22 AM  Airway not difficult    General Information and Staff    Patient location during procedure: OR  CRNA/CAA: Liam Dave CRNA    Indications and Patient Condition  Indications for airway management: airway protection    Preoxygenated: yes  Mask difficulty assessment: 1 - vent by mask    Final Airway Details  Final airway type: endotracheal airway      Successful airway: ETT  Cuffed: yes   Successful intubation technique: direct laryngoscopy  Facilitating devices/methods: intubating stylet  Endotracheal tube insertion site: oral  Blade: Tana  Blade size: 4  ETT size (mm): 7.5  Cormack-Lehane Classification: grade I - full view of glottis  Placement verified by: chest auscultation and capnometry   Measured from: lips  ETT/EBT  to lips (cm): 21  Number of attempts at approach: 1  Assessment: lips, teeth, and gum same as pre-op and atraumatic intubation    Additional Comments  Dentition and Lips as preoperative assessment. Airway placed without complication. ETT cuff inflated to minimal occlusive pressure.

## 2024-10-05 NOTE — OP NOTE
10BH Luiz Elmore  : [id number]  CSN: [id number]  Date: 10/5/2024    Operative Note    Pre-op Diagnosis:  Desires permanent surgical sterilization     Post-op Diagnosis:  Same     Procedure: Postpartum tubal ligation with modified Summer technique     Surgeon: Laly Damian M.D.     OR Staff: Circulator: Nano Nice RN  Scrub Person: Yoon Arriaga     Anesthesia: GETA     Estimated Blood Loss: 10 mls     Drains: Mejia      Specimens:  Bilateral tubal segments     Findings: Grossly normal appearing fallopian tubes bilaterally.     Complications: none     Indications:  Patient is a 24 y.o. year old  who recently had an uncomplicated vaginal delivery.  She did express the desire for permanent sterilization.  Appropriate surgical consents were signed and on chart.  Desire to proceed with the operation was reconfirmed prior to beginning the procedure.  The patient had been counseled regarding the risks, complications, benefits, and other alternatives for birth control.  The patient had also been informed regarding the failure of the procedure as well as increased risk of ectopic gestation if the tubal should fail.    Procedure: After the appropriate time out, the patient was prepped and draped in the usual sterile fashion.  The bladder had been drained with a mejia catheter.  An infraumbilical incision was made with the knife and carried down to the fascia.  The fascia was nicked with a knife and the incision was extended bilaterally with scissors.  The peritoneum was identified and entered sharply without any complications.  The patient was placed in trendelenburg position.  The left fallopian tube was grasped with a Maria D clamp and identified by its fimbriated end.  A two centimeter knuckle was doubly ligated with 0-plain catgut and and sharply excised. Likewise, the same procedure was performed on the contralateral side without any complications. The specimens were sent for pathological  examination.  Repeat inspection bilaterally revealed adequate hemostasis.  The subfascial tissue was inspected and noted to be hemostatic.  The fascia and peritoneum was closed with 0 Vicryl in a running, non-locking fashion.  The subcutaneous tissue was inspected and noted to be hemostatic with minimal bovie electrocautery.  The skin was approximated with 4-0 nylon in an interrupted fashion.    There was scant bleeding.  A dressing was applied.  There were no complications.  All instruments and sponge counts were correct at the end of the procedure.  The patient tolerated the procedure well and was taken to postoperative recovery room in stable condition.    Laly Damian M.D.  10/5/2024  09:01 EDT

## 2024-10-05 NOTE — ANESTHESIA POSTPROCEDURE EVALUATION
Patient: Summer Elmore    Procedure Summary       Date: 10/05/24 Room / Location: [facility] OR  /  [facility]    Anesthesia Start: 0815 Anesthesia Stop: 0911    Procedure: POSTPARTUM TUBAL LIGATION (Bilateral: Abdomen) Diagnosis:       Request for sterilization      (Request for sterilization [Z30.2])    Surgeons: Laly Damian MD Provider: Liam Dave CRNA    Anesthesia Type: general ASA Status: 2            Anesthesia Type: general    Vitals  Vitals Value Taken Time   /85 10/05/24 1020   Temp 97.6 °F (36.4 °C) 10/05/24 1020   Pulse 57 10/05/24 1030   Resp 16 10/05/24 1020   SpO2 94 % 10/05/24 1030   Vitals shown include unfiled device data.          Post Anesthesia Care and Evaluation    Patient location during evaluation: PACU  Patient participation: complete - patient participated  Level of consciousness: awake  Pain score: 3  Pain management: adequate    Airway patency: patent  Anesthetic complications: No anesthetic complications  PONV Status: controlled  Cardiovascular status: acceptable and stable  Respiratory status: acceptable and face mask  Hydration status: acceptable    Comments: See Nursing record for post procedural Vital Signs as per protocol

## 2024-10-05 NOTE — PLAN OF CARE
Goal Outcome Evaluation:  Plan of Care Reviewed With: patient        Progress: improving  Outcome Evaluation: VSS,adequate pain relief, bottlefeeding infant, NPO for PPTL,+ bonding noted, continue with current plan of care

## 2024-10-05 NOTE — INTERVAL H&P NOTE
H&P updated. The patient was examined and there are no changes to the H&P other than patient s/p .  Pt is certain she no longer desires childbearing.  She has been counseled regarding the risks, complications, benefits, and other alternatives as well as the failure rate of her tubal ligation.

## 2024-10-06 VITALS
RESPIRATION RATE: 16 BRPM | BODY MASS INDEX: 28.63 KG/M2 | TEMPERATURE: 98.7 F | HEIGHT: 64 IN | DIASTOLIC BLOOD PRESSURE: 72 MMHG | SYSTOLIC BLOOD PRESSURE: 110 MMHG | WEIGHT: 167.7 LBS | OXYGEN SATURATION: 98 % | HEART RATE: 74 BPM

## 2024-10-06 PROCEDURE — 0503F POSTPARTUM CARE VISIT: CPT | Performed by: OBSTETRICS & GYNECOLOGY

## 2024-10-06 RX ORDER — IBUPROFEN 600 MG/1
600 TABLET, FILM COATED ORAL EVERY 6 HOURS PRN
Qty: 60 TABLET | Refills: 0 | Status: SHIPPED | OUTPATIENT
Start: 2024-10-06

## 2024-10-06 RX ORDER — DOCUSATE SODIUM 100 MG/1
100 CAPSULE, LIQUID FILLED ORAL 2 TIMES DAILY
Qty: 60 CAPSULE | Refills: 0 | Status: SHIPPED | OUTPATIENT
Start: 2024-10-06

## 2024-10-06 RX ORDER — ACETAMINOPHEN 500 MG
1000 TABLET ORAL EVERY 8 HOURS PRN
Qty: 60 TABLET | Refills: 0 | Status: SHIPPED | OUTPATIENT
Start: 2024-10-06

## 2024-10-06 RX ORDER — FERROUS SULFATE 325(65) MG
325 TABLET ORAL
Qty: 60 TABLET | Refills: 0 | Status: SHIPPED | OUTPATIENT
Start: 2024-10-06

## 2024-10-06 RX ADMIN — ACETAMINOPHEN 650 MG: 325 TABLET, FILM COATED ORAL at 02:37

## 2024-10-06 RX ADMIN — PRENATAL VITAMINS-IRON FUMARATE 27 MG IRON-FOLIC ACID 0.8 MG TABLET 1 TABLET: at 08:01

## 2024-10-06 RX ADMIN — IBUPROFEN 600 MG: 600 TABLET ORAL at 08:01

## 2024-10-06 NOTE — PLAN OF CARE
Goal Outcome Evaluation:         Pt bonding well with baby.  Lochia Normal. VS. Ambulating without difficulty.  Pain well controlled with Tylenol and Motrin this morning. Pt home sent home with baby in car seat and FOB driving.

## 2024-10-06 NOTE — PLAN OF CARE
Goal Outcome Evaluation:  Plan of Care Reviewed With: patient           Outcome Evaluation: VSS, bonding well with infant, pain controlled with rx meds. Rand wnl. Sitz bath offered but pt declined. Pt educated on the importance of sitz bath and healing process.

## 2024-10-06 NOTE — DISCHARGE SUMMARY
Discharge Summary     [facility] Elmore  : 2000  MRN: [id number]  CSN: [id number]    Date of Admission: 10/3/2024   Date of Discharge:  10/6/2024   Delivering Physician: Nano DENG Foster        Admission Diagnosis: Pregnancy [Z34.90] 39 weeks  Desires permanent surgical sterilization   Discharge Diagnosis: Same as above plus  Pregnancy at 39w0d - delivered  Postpartum anemia       Procedures: 10/4/2024  - Vaginal, Spontaneous    10/5/2024 -postpartum bilateral tubal ligation     Hospital Course  Patient is a 24 y.o.  who at 39w0d had a vaginal birth.  Her postpartum course was without complications.  On postpartum day #1 patient underwent a bilateral tubal ligation without any complications.  On PPD #2 she was ready for discharge.  She had normal lochia and pain was well controlled with oral medications.  Discharge instructions and precautions were given.    Vitals  Min/max vitals past 24 hours:   Temp  Min: 97 °F (36.1 °C)  Max: 98.7 °F (37.1 °C)  BP  Min: 98/64  Max: 129/88  Pulse  Min: 59  Max: 118  Resp  Min: 14  Max: 18    Fluid balance past 24 hours:  I/O last 3 completed shifts:  In: 800 [I.V.:800]  Out: 10 [Blood:10]           Physical Exam  General Appearance: well developed, well nourished, not in any acute distress   Respiratory: breathing is unlabored, clear to auscultation bilaterally   CV: regular rate and rhythm, S1, S2 normal, no murmur, click, rub or gallop   Abdomen: soft, non-tender, no palpable masses; fundus firm and non-tender  incision is covered by bandage   Pelvic: not performed   Extremities: moves extremities well; normal appearance with no cyanosis or edema and no calf tenderness     Infant  male  fetus weighing 3674 g (8 lb 1.6 oz)   Apgars -  7 @ 1 minute /  9 @ 5 minutes.    Discharge labs  Lab Results   Component Value Date    WBC 7.90 10/05/2024    HGB 10.4 (L) 10/05/2024    HCT 30.8 (L) 10/05/2024     (L) 10/05/2024       Discharge Medications      Discharge Medications        New Medications        Instructions Start Date   acetaminophen 500 MG tablet  Commonly known as: TYLENOL   1,000 mg, Oral, Every 8 Hours PRN      docusate sodium 100 MG capsule  Commonly known as: Colace   100 mg, Oral, 2 Times Daily      ferrous sulfate 325 (65 FE) MG tablet   325 mg, Oral, 2 Times Daily Before Meals      ibuprofen 600 MG tablet  Commonly known as: ADVIL,MOTRIN   600 mg, Oral, Every 6 Hours PRN             Continue These Medications        Instructions Start Date   prenatal (CLASSIC) vitamin 28-0.8 MG tablet tablet  Generic drug: prenatal vitamin   Oral, Daily      promethazine 25 MG tablet  Commonly known as: PHENERGAN   25 mg, Oral               Discharge Disposition: Home   Condition on Discharge: Stable   Follow-up: 1 week with AVRIL Dee   Time spent: 20  minutes  Laly Damian M.D.  10/6/2024

## 2024-10-07 LAB
BH BB BLOOD EXPIRATION DATE: NORMAL
BH BB BLOOD EXPIRATION DATE: NORMAL
BH BB BLOOD TYPE BARCODE: 2800
BH BB BLOOD TYPE BARCODE: 2800
BH BB DISPENSE STATUS: NORMAL
BH BB DISPENSE STATUS: NORMAL
BH BB PRODUCT CODE: NORMAL
BH BB PRODUCT CODE: NORMAL
BH BB UNIT NUMBER: NORMAL
BH BB UNIT NUMBER: NORMAL
CROSSMATCH INTERPRETATION: NORMAL
CROSSMATCH INTERPRETATION: NORMAL
UNIT  ABO: NORMAL
UNIT  ABO: NORMAL
UNIT  RH: NORMAL
UNIT  RH: NORMAL

## 2024-10-07 NOTE — PAYOR COMM NOTE
"To:  [facility]  From: Orly Ovalle RN  Phone: [phone]  Fax: [phone]  NPI: [id number]  TIN: [id number]  Member ID: [id number]   MRN: [id number]    Eulalia Moore (24 y.o. Female)       Date of Birth   2000    Social Security Number       Address   [address] [place], [place] [address] [address]    Home Phone   [phone]    MRN   [id number]       Congregational   None    Marital Status   Single                            Admission Date   10/3/24    Admission Type   Elective    Admitting Provider   Dennys Goode MD    Attending Provider       Department, Room/Bed   [facility], W2       Discharge Date   10/6/2024    Discharge Disposition   Home or Self Care    Discharge Destination                                 Attending Provider: (none)   Allergies: Morphine And Codeine, Oxycodone, Amoxicillin, Dermoplast [Benzocaine-menthol]    Isolation: None   Infection: None   Code Status: Prior    Ht: 162.6 cm (64\")   Wt: 76.1 kg (167 lb 11.2 oz)    Admission Cmt: None   Principal Problem: Request for sterilization [Z30.2]                   Active Insurance as of 10/3/2024       Primary Coverage       Payor Plan Insurance Group Employer/Plan Group    [employer] [place] [employer]        Payor Plan Address Payor Plan Phone Number Payor Plan Fax Number Effective Dates    [address] [address] [phone]  2017 - None Entered    [place] [address]         Subscriber Name Subscriber Birth Date Member ID       EULALIA MOORE 2000 [id number]                     Emergency Contacts        (Rel.) Home Phone Work Phone Mobile Phone    Philly Caal (Mother) [phone] -- --                 Discharge Summary        Laly Damian MD at 10/06/24 0858          Discharge Summary     [facility] Moore  : 2000  MRN: [id number]  CSN: [id number]    Date of Admission: 10/3/2024   Date of Discharge:  10/6/2024   Delivering Physician: Nano Quintanilla " Diagnosis: Pregnancy [Z34.90] 39 weeks  Desires permanent surgical sterilization   Discharge Diagnosis: Same as above plus  Pregnancy at 39w0d - delivered  Postpartum anemia       Procedures: 10/4/2024  - Vaginal, Spontaneous    10/5/2024 -postpartum bilateral tubal ligation     Hospital Course  Patient is a 24 y.o.  who at 39w0d had a vaginal birth.  Her postpartum course was without complications.  On postpartum day #1 patient underwent a bilateral tubal ligation without any complications.  On PPD #2 she was ready for discharge.  She had normal lochia and pain was well controlled with oral medications.  Discharge instructions and precautions were given.    Vitals  Min/max vitals past 24 hours:   Temp  Min: 97 °F (36.1 °C)  Max: 98.7 °F (37.1 °C)  BP  Min: 98/64  Max: 129/88  Pulse  Min: 59  Max: 118  Resp  Min: 14  Max: 18    Fluid balance past 24 hours:  I/O last 3 completed shifts:  In: 800 [I.V.:800]  Out: 10 [Blood:10]           Physical Exam  General Appearance: well developed, well nourished, not in any acute distress   Respiratory: breathing is unlabored, clear to auscultation bilaterally   CV: regular rate and rhythm, S1, S2 normal, no murmur, click, rub or gallop   Abdomen: soft, non-tender, no palpable masses; fundus firm and non-tender  incision is covered by bandage   Pelvic: not performed   Extremities: moves extremities well; normal appearance with no cyanosis or edema and no calf tenderness     Infant  male  fetus weighing 3674 g (8 lb 1.6 oz)   Apgars -  7 @ 1 minute /  9 @ 5 minutes.    Discharge labs  Lab Results   Component Value Date    WBC 7.90 10/05/2024    HGB 10.4 (L) 10/05/2024    HCT 30.8 (L) 10/05/2024     (L) 10/05/2024       Discharge Medications     Discharge Medications        New Medications        Instructions Start Date   acetaminophen 500 MG tablet  Commonly known as: TYLENOL   1,000 mg, Oral, Every 8 Hours PRN      docusate sodium 100 MG capsule  Commonly known as:  Colace   100 mg, Oral, 2 Times Daily      ferrous sulfate 325 (65 FE) MG tablet   325 mg, Oral, 2 Times Daily Before Meals      ibuprofen 600 MG tablet  Commonly known as: ADVIL,MOTRIN   600 mg, Oral, Every 6 Hours PRN             Continue These Medications        Instructions Start Date   prenatal (CLASSIC) vitamin 28-0.8 MG tablet tablet  Generic drug: prenatal vitamin   Oral, Daily      promethazine 25 MG tablet  Commonly known as: PHENERGAN   25 mg, Oral               Discharge Disposition: Home   Condition on Discharge: Stable   Follow-up: 1 week with AVRIL Dee   Time spent: 20  minutes  Laly Damian M.D.  10/6/2024     Electronically signed by Laly Damian MD at 10/06/24 0808

## 2024-10-08 LAB — REF LAB TEST METHOD: NORMAL

## 2024-10-14 ENCOUNTER — OFFICE VISIT (OUTPATIENT)
Dept: OBSTETRICS AND GYNECOLOGY | Facility: CLINIC | Age: 24
End: 2024-10-14
Payer: COMMERCIAL

## 2024-10-14 VITALS
HEIGHT: 64 IN | BODY MASS INDEX: 25.61 KG/M2 | SYSTOLIC BLOOD PRESSURE: 112 MMHG | DIASTOLIC BLOOD PRESSURE: 68 MMHG | WEIGHT: 150 LBS

## 2024-10-14 DIAGNOSIS — Z09 POSTOP CHECK: Primary | ICD-10-CM

## 2024-10-14 PROCEDURE — 99024 POSTOP FOLLOW-UP VISIT: CPT | Performed by: MIDWIFE

## 2024-10-14 NOTE — PROGRESS NOTES
"Subjective   Chief Complaint   Patient presents with    Post-op Follow-up     Post op Tubal      Summer Elmore is a 24 y.o. year old  presenting to be seen for her post-operative visit.  Currently she reports no problems with eating, bowel movements, voiding, or wound drainage and pain is well controlled.    The pathology results from her procedure are in Summer's record and are benign.      OTHER COMPLAINTS:  Nothing else       Objective   /68   Ht 162.6 cm (64\")   Wt 68 kg (150 lb)   LMP 2023   BMI 25.75 kg/m²     General:  well developed; well nourished  no acute distress  mentation appropriate   Abdomen: incision is clean, dry, intact, and without drainage   Pelvis: Not performed.          Assessment   S/P BTL     Plan   Nothing per vagina still until 6 weeks after her procedure  Follow up 4 weeks    No orders of the defined types were placed in this encounter.         This note was electronically signed.  Nano Santos, APRN  2024  "

## 2024-10-15 ENCOUNTER — MATERNAL SCREENING (OUTPATIENT)
Dept: CALL CENTER | Facility: HOSPITAL | Age: 24
End: 2024-10-15
Payer: COMMERCIAL

## 2024-10-15 NOTE — OUTREACH NOTE
Maternal Screening Survey      Flowsheet Row Responses   Facility patient discharged from? [facility]   Attempt successful? Yes   Call start time 1311   Call end time 1313   I have been able to laugh and see the funny side of things. 0   I have looked forward with enjoyment to things. 0   I have blamed myself unnecessarily when things went wrong. 0   I have been anxious or worried for no good reason. 0   I have felt scared or panicky for no good reason. 0   Things have been getting on top of me. 0   I have been so unhappy that I have had difficulty sleeping. 0   I have felt sad or miserable. 0   I have been so unhappy that I have been crying. 0   The thought of harming myself has occurred to me. 0   Chelsea  Depression Scale Total 0   Did any of your parents have problems with alcohol or drug use? No   Do any of your peers have problems with alcohol or drug use? No   Does your partner have problems with alcohol or drug use? No   Before you were pregnant did you have problems with alcohol or drug use? (past) No   In the past month, did you drink beer, wine, liquor or use any other drugs? (pregnancy) No   Maternal Screening call completed Yes   Call end time 1313              Sarahi GONZALEZ - Registered Nurse

## 2024-11-11 ENCOUNTER — POSTPARTUM VISIT (OUTPATIENT)
Dept: OBSTETRICS AND GYNECOLOGY | Facility: CLINIC | Age: 24
End: 2024-11-11
Payer: COMMERCIAL

## 2024-11-11 VITALS
WEIGHT: 145 LBS | HEIGHT: 64 IN | SYSTOLIC BLOOD PRESSURE: 112 MMHG | DIASTOLIC BLOOD PRESSURE: 74 MMHG | BODY MASS INDEX: 24.75 KG/M2

## 2024-11-11 PROBLEM — Z34.90 PREGNANCY: Status: RESOLVED | Noted: 2024-10-03 | Resolved: 2024-11-11

## 2024-11-11 PROBLEM — Z67.91 RH NEGATIVE STATUS DURING PREGNANCY IN THIRD TRIMESTER: Status: RESOLVED | Noted: 2024-07-24 | Resolved: 2024-11-11

## 2024-11-11 PROBLEM — Z30.2 REQUEST FOR STERILIZATION: Status: RESOLVED | Noted: 2024-07-24 | Resolved: 2024-11-11

## 2024-11-11 PROBLEM — O26.893 RH NEGATIVE STATUS DURING PREGNANCY IN THIRD TRIMESTER: Status: RESOLVED | Noted: 2024-07-24 | Resolved: 2024-11-11

## 2024-11-11 PROCEDURE — 99212 OFFICE O/P EST SF 10 MIN: CPT | Performed by: MIDWIFE

## 2024-11-11 NOTE — PROGRESS NOTES
"History  Chief Complaint   Patient presents with    Postpartum Care     6 week postpartum care        Summer Elmore is a 24 y.o. year old  presenting to be seen for her postpartum visit.  She had a vaginal delivery and PPTL .    Since delivery she has not been sexually active.   She does not have concerns about post-partum blues/depression.   She is bottle feeding.  For ongoing contraception, her plans are tubal ligation.  She has not had a menstrual cycle.         Physical  /74   Ht 162.6 cm (64\")   Wt 65.8 kg (145 lb)   LMP 2023   Breastfeeding No   BMI 24.89 kg/m²     General:  well developed; well nourished  no acute distress  mentation appropriate   Abdomen: soft, non-tender; no masses   Pelvis: External genitalia:  normal appearance of the external genitalia including Bartholin's and Ivy's glands.  Uterus:  normal size, shape and consistency.  Adnexa:  normal bimanual exam of the adnexa.        Assessment   Normal 6 week postpartum exam  Contraceptive management     Plan   BC options reviewed and compared today: tubal sterilization  Pap smear normal 24  Follow up 1 year    No orders of the defined types were placed in this encounter.         This note was electronically signed.  INGE Nielson  2024  "

## 2025-04-10 NOTE — PLAN OF CARE
PROCEDURE PERFORMED:  Left Suprascapular nerve peripheral nerve stimulator implantation using ultrasound guidance    PREOPERATIVE DIAGNOSIS: Left suprascapular nerve entrapment    INDICATIONS: Chronic left shoulder pain    The patient's history and physical exam were reviewed.  The risk, benefits, and alternatives of the procedure were discussed and all questions were answered to the patient's satisfaction.  The patient agreed to proceed and written informed consent was obtained.    POSTOPERATIVE DIAGNOSIS: Same    PHYSICIAN:  Dr. Frankie Ledezma DO    ANESTHESIA:  LOCAL    ASSISTANT:  NONE    PATHOLOGY:  NONE    ESTIMATED BLOOD LOSS:  N/A    IMPLANTS: Peripheral nerve stimulator    PROCEDURE DESCRIPTION: Left Suprascapular nerve peripheral nerve stimulator implantation using ultrasound guidance    The patient was placed on the operative bed in seated position.  The area was prepped with Chlorhexidine.  The area was then draped in a sterile fashion. An ultrasound transducer was placed over the scapula and was used to identify the scapular notch.  Then, the skin and subcutaneous tissues in the area were anesthetized with 1% lidocaine.    A percutaneous sleeve and stimulating probe lead introduction system were assembled, inserted and advanced along the intended course of the suprascapular nerve, taking care to maintain the proper depth of insertion as the introducer was advanced under ultrasound guidance.  The introducer needle was delivered to a location in proximity to the nerve.    Multiple stimulation parameters were used to deliver stimulation to the suprascapular nerve in concert with stimulating at multiple positions around the nerve.  Nerve target acquisition was confirmed noting generation of paresthesia in the shoulder corresponding to the nerve being stimulated.  Various electrical parameter combinations were tested, and the lead location was adjusted until the patient indicated paresthesia overlapping the  Problem: Patient Care Overview (Adult)  Goal: Plan of Care Review  Outcome: Ongoing (interventions implemented as appropriate)  Goal: Adult Individualization and Mutuality  Outcome: Ongoing (interventions implemented as appropriate)  Goal: Discharge Needs Assessment  Outcome: Ongoing (interventions implemented as appropriate)    Problem: Postpartum, Vaginal Delivery (Adult)  Goal: Signs and Symptoms of Listed Potential Problems Will be Absent or Manageable (Postpartum, Vaginal Delivery)  Outcome: Ongoing (interventions implemented as appropriate)

## (undated) DEVICE — VIOLET BRAIDED (POLYGLACTIN 910), SYNTHETIC ABSORBABLE SUTURE: Brand: COATED VICRYL

## (undated) DEVICE — CATHETER,URETHRAL,REDRUBBER,STRL,16FR: Brand: MEDLINE

## (undated) DEVICE — SUT GUT PLAIN 0 REEL 54IN L104G

## (undated) DEVICE — SPNG GZ WOVN 4X4IN 12PLY 10/BX STRL

## (undated) DEVICE — SOL IRR H2O BTL 1000ML STRL

## (undated) DEVICE — SLV SCD CALF HEMOFORCE DVT THERP REPROC MD

## (undated) DEVICE — RICH MAJOR PROCEDURE: Brand: MEDLINE INDUSTRIES, INC.

## (undated) DEVICE — ANTIBACTERIAL UNDYED BRAIDED (POLYGLACTIN 910), SYNTHETIC ABSORBABLE SUTURE: Brand: COATED VICRYL

## (undated) DEVICE — SUT ETHLN 3/0 FS1 663G

## (undated) DEVICE — DRSNG WND BORDR/ADHS NONADHR/GZ LF 4X4IN STRL

## (undated) DEVICE — GLV SURG BIOGEL M LTX PF 6 1/2